# Patient Record
Sex: MALE | Race: WHITE | NOT HISPANIC OR LATINO | Employment: OTHER | URBAN - METROPOLITAN AREA
[De-identification: names, ages, dates, MRNs, and addresses within clinical notes are randomized per-mention and may not be internally consistent; named-entity substitution may affect disease eponyms.]

---

## 2018-07-27 RX ORDER — LEVOTHYROXINE SODIUM 0.05 MG/1
TABLET ORAL
COMMUNITY
Start: 2013-03-26 | End: 2018-07-30 | Stop reason: ALTCHOICE

## 2018-07-27 RX ORDER — ERGOCALCIFEROL 1.25 MG/1
1 CAPSULE ORAL WEEKLY
COMMUNITY
Start: 2013-03-26 | End: 2018-07-30 | Stop reason: ALTCHOICE

## 2018-07-27 RX ORDER — PAROXETINE HYDROCHLORIDE 20 MG/1
TABLET, FILM COATED ORAL DAILY
COMMUNITY
Start: 2010-07-30 | End: 2018-07-30 | Stop reason: ALTCHOICE

## 2018-07-27 RX ORDER — CITALOPRAM 20 MG/1
1 TABLET ORAL DAILY
COMMUNITY
Start: 2013-07-18 | End: 2018-07-30 | Stop reason: ALTCHOICE

## 2018-07-27 RX ORDER — CYCLOBENZAPRINE HCL 5 MG
1 TABLET ORAL 3 TIMES DAILY PRN
COMMUNITY
Start: 2014-01-13 | End: 2018-07-30 | Stop reason: ALTCHOICE

## 2018-07-30 ENCOUNTER — OFFICE VISIT (OUTPATIENT)
Dept: FAMILY MEDICINE CLINIC | Facility: CLINIC | Age: 53
End: 2018-07-30
Payer: COMMERCIAL

## 2018-07-30 VITALS
BODY MASS INDEX: 30.63 KG/M2 | HEART RATE: 74 BPM | WEIGHT: 179.4 LBS | SYSTOLIC BLOOD PRESSURE: 116 MMHG | TEMPERATURE: 97.5 F | HEIGHT: 64 IN | DIASTOLIC BLOOD PRESSURE: 60 MMHG | OXYGEN SATURATION: 97 % | RESPIRATION RATE: 16 BRPM

## 2018-07-30 DIAGNOSIS — H60.501 ACUTE OTITIS EXTERNA OF RIGHT EAR, UNSPECIFIED TYPE: Primary | ICD-10-CM

## 2018-07-30 PROCEDURE — 3008F BODY MASS INDEX DOCD: CPT | Performed by: FAMILY MEDICINE

## 2018-07-30 PROCEDURE — 99202 OFFICE O/P NEW SF 15 MIN: CPT | Performed by: FAMILY MEDICINE

## 2018-07-30 RX ORDER — TOBRAMYCIN AND DEXAMETHASONE 3; 1 MG/ML; MG/ML
4 SUSPENSION/ DROPS OPHTHALMIC 2 TIMES DAILY
Qty: 5 ML | Refills: 0 | Status: SHIPPED | OUTPATIENT
Start: 2018-07-30 | End: 2018-11-22 | Stop reason: ALTCHOICE

## 2018-07-30 NOTE — PROGRESS NOTES
Subjective:      Patient ID: Tyrus Apley is a 50 y o  male  patient is here to establish care  He is complaining itching and irritation  inside the right ear  This has been going on for almost 3 months now  Patient states that he started using peroxide ear drops  Which helped with his symptoms  Patient states that he thinks he is otherwise healthy  Review of his medications revealed that he used to be on anxiety medication in the past   Patient states that he stopped taking the medication a few years ago  He is currently denying any symptoms of anxiety and depression  He also used to take a low-dose of levothyroxine which he stopped when he stopped other medications  Patient has not had labs for a few years  Patient offered basic labs and screening colonoscopy  He is declining these today  Patient states that he will check with his insurance and follow-up for an annual physical   Ear Fullness    There is pain in the right ear  This is a chronic problem  Episode onset: 3 months  The problem has been unchanged  There has been no fever  The patient is experiencing no pain  Pertinent negatives include no coughing, ear discharge, headaches, hearing loss, neck pain, rash, rhinorrhea, sore throat or vomiting  Treatments tried: OTC Peroxide ear drops  The treatment provided mild relief  Past Medical History:   Diagnosis Date    Depression        No family history on file  No past surgical history on file  reports that he has been smoking  He has never used smokeless tobacco  He reports that he does not drink alcohol or use drugs        Current Outpatient Prescriptions:     tobramycin-dexamethasone (TOBRADEX) ophthalmic suspension, Apply 4 drops to eye 2 (two) times a day for 5 days APPLY IN RIGHT EAR CANAL , Disp: 5 mL, Rfl: 0    The following portions of the patient's history were reviewed and updated as appropriate: allergies, current medications, past family history, past medical history, past social history, past surgical history and problem list     Review of Systems   Constitutional: Negative  HENT: Negative for ear discharge, hearing loss, rhinorrhea and sore throat  Irritation of right ear canal     Respiratory: Negative  Negative for cough  Cardiovascular: Negative  Gastrointestinal: Negative  Negative for vomiting  Musculoskeletal: Negative  Negative for myalgias and neck pain  Skin: Negative for rash  Neurological: Negative  Negative for headaches  Psychiatric/Behavioral: Negative  Objective:    /60   Pulse 74   Temp 97 5 °F (36 4 °C)   Resp 16   Ht 5' 4" (1 626 m)   Wt 81 4 kg (179 lb 6 4 oz)   SpO2 97%   BMI 30 79 kg/m²      Physical Exam   Constitutional: He is oriented to person, place, and time  He appears well-developed and well-nourished  HENT:   Left Ear: External ear normal    Desquamated skin of the Rt Ear canal    TM- Normal     Cardiovascular: Normal rate, regular rhythm and normal heart sounds  Pulmonary/Chest: Effort normal and breath sounds normal    Abdominal: Soft  Bowel sounds are normal    Neurological: He is alert and oriented to person, place, and time  Psychiatric: His behavior is normal  Judgment normal          No results found for this or any previous visit (from the past 1008 hour(s))  Assessment/Plan:      Patient started on ear drops to be applied  In the right ear canal      Advised against the use of peroxide drops without medical supervision  Patient does not want any labs at today's visit  He will check with his insurance and then follow-up for an annual physical        Diagnoses and all orders for this visit:    Acute otitis externa of right ear, unspecified type  -     tobramycin-dexamethasone (TOBRADEX) ophthalmic suspension; Apply 4 drops to eye 2 (two) times a day for 5 days APPLY IN RIGHT EAR CANAL

## 2018-11-22 ENCOUNTER — HOSPITAL ENCOUNTER (EMERGENCY)
Facility: HOSPITAL | Age: 53
Discharge: HOME | End: 2018-11-22
Attending: EMERGENCY MEDICINE | Admitting: EMERGENCY MEDICINE
Payer: COMMERCIAL

## 2018-11-22 VITALS
TEMPERATURE: 97.6 F | DIASTOLIC BLOOD PRESSURE: 77 MMHG | BODY MASS INDEX: 29.52 KG/M2 | WEIGHT: 171.96 LBS | OXYGEN SATURATION: 98 % | HEART RATE: 90 BPM | RESPIRATION RATE: 16 BRPM | SYSTOLIC BLOOD PRESSURE: 129 MMHG

## 2018-11-22 DIAGNOSIS — S05.02XA ABRASION OF LEFT CORNEA, INITIAL ENCOUNTER: Primary | ICD-10-CM

## 2018-11-22 PROCEDURE — 99283 EMERGENCY DEPT VISIT LOW MDM: CPT

## 2018-11-22 RX ORDER — NAPROXEN 500 MG/1
500 TABLET ORAL 2 TIMES DAILY PRN
Qty: 20 TABLET | Refills: 0 | Status: SHIPPED | OUTPATIENT
Start: 2018-11-22 | End: 2020-08-03

## 2018-11-22 RX ORDER — TETRACAINE HYDROCHLORIDE 5 MG/ML
1 SOLUTION OPHTHALMIC ONCE
Status: COMPLETED | OUTPATIENT
Start: 2018-11-22 | End: 2018-11-22

## 2018-11-22 RX ORDER — TOBRAMYCIN 3 MG/ML
1 SOLUTION/ DROPS OPHTHALMIC ONCE
Status: COMPLETED | OUTPATIENT
Start: 2018-11-22 | End: 2018-11-22

## 2018-11-22 RX ADMIN — TETRACAINE HYDROCHLORIDE 1 DROP: 5 SOLUTION OPHTHALMIC at 17:55

## 2018-11-22 RX ADMIN — FLUORESCEIN SODIUM 1 STRIP: 1 STRIP OPHTHALMIC at 17:55

## 2018-11-22 RX ADMIN — TOBRAMYCIN 1 DROP: 3 SOLUTION OPHTHALMIC at 18:17

## 2018-11-22 NOTE — DISCHARGE INSTRUCTIONS
Corneal Abrasion   WHAT YOU NEED TO KNOW:   A corneal abrasion is a scratch on the cornea of your eye  The cornea is the clear layer that covers the front of your eye  A small scratch may heal in 1 to 2 days  Deeper or larger scratches may take longer to heal         DISCHARGE INSTRUCTIONS:   Contact your healthcare provider if:   · Your eye pain or vision gets worse  · You have yellow or green drainage from your eye  · You have questions or concerns about your condition or care  Medicines:   · Medicines  may be given in the form of eyedrops or ointment to help prevent an eye infection  You may also be given eye drops to decrease pain  Ask how to take this medicine safely  · Take your medicine as directed  Contact your healthcare provider if you think your medicine is not helping or if you have side effects  Tell him or her if you are allergic to any medicine  Keep a list of the medicines, vitamins, and herbs you take  Include the amounts, and when and why you take them  Bring the list or the pill bottles to follow-up visits  Carry your medicine list with you in case of an emergency  Follow up with your healthcare provider as directed:  Write down your questions so you remember to ask them during your visits  Self-care:   · Do not touch or rub your eye  · Ask your healthcare provider when you can start your normal activities  · Ask your healthcare provider when you can wear your contact lenses  · Wear sunglasses in bright light until your eyes feel better  Help prevent corneal abrasions:   · Remove your contact lenses if your eyes feel dry or irritated  · Wash your hands if you need to touch your eyes or your face  · Trim your child's fingernails so he cannot scratch his eye  · Wear protective eyewear when you work with chemicals, wood, dust, or metal      · Wear protective eyewear when you play sports  · Do not wear your contacts for longer than you should       · Do not wear colored lenses or lenses with shapes on them  These lenses may cause eye damage and vision loss  · Do not wear glitter makeup  Glitter can easily get into your eyes and under contact lenses  · Do not sleep with your contacts in your eyes  © 2017 [address] [employer] Information is for End User's use only and may not be sold, redistributed or otherwise used for commercial purposes  All illustrations and images included in CareNotes® are the copyrighted property of [employer] , [employer]  or [employer]  The above information is an  only  It is not intended as medical advice for individual conditions or treatments  Talk to your doctor, nurse or pharmacist before following any medical regimen to see if it is safe and effective for you

## 2018-11-22 NOTE — ED PROVIDER NOTES
History  Chief Complaint   Patient presents with    Foreign Body in Eye     Pt reports "grinding" approx 4 hours ago, concerned for metal in left eye  History provided by:  Patient and spouse  Foreign Body in Eye   Location:  L eye  Suspected object:  Metal  Quality: Feels like something still in my eye  Pain severity:  Mild  Duration:  4 hours  Timing:  Constant  Progression:  Improving (Flushed his eye with a large amount of water and this significantly improved his symptoms)  Chronicity:  New  Worsened by:  Nothing      None       Past Medical History:   Diagnosis Date    Depression        History reviewed  No pertinent surgical history  Family History   Problem Relation Age of Onset    No Known Problems Mother     No Known Problems Father      I have reviewed and agree with the history as documented  Social History   Substance Use Topics    Smoking status: Current Every Day Smoker    Smokeless tobacco: Never Used    Alcohol use No        Review of Systems   Constitutional: Negative for fever  Respiratory: Negative for chest tightness and shortness of breath  Cardiovascular: Negative for chest pain  Skin: Negative for rash  Neurological: Negative for dizziness, light-headedness and headaches  Physical Exam  Physical Exam   Constitutional: He appears well-developed  HENT:   Head: Atraumatic  Eyes: Conjunctivae are normal  Right eye exhibits no discharge  Left eye exhibits no discharge  No scleral icterus  Floor seen staining of left eye , there is 2 separate areas of uptake, 1 directly over center of pupil 1 left lower sclera  One over the pupil is very punctate  There does not appear to be in overlying foreign body just an area of uptake consistent with a small abrasion  There also appears to be a scleral abrasion   Neck: Neck supple  No tracheal deviation present  Pulmonary/Chest: Effort normal  No stridor  No respiratory distress     Musculoskeletal: He exhibits no deformity  Neurological: He is alert  He exhibits normal muscle tone  Coordination normal    Skin: Skin is warm and dry  No rash noted  No erythema  No pallor  Psychiatric: He has a normal mood and affect  Vitals reviewed  Vital Signs  ED Triage Vitals [11/22/18 1742]   Temperature Pulse Respirations Blood Pressure SpO2   97 6 °F (36 4 °C) 90 16 129/77 98 %      Temp Source Heart Rate Source Patient Position - Orthostatic VS BP Location FiO2 (%)   Oral -- -- -- --      Pain Score       2           Vitals:    11/22/18 1742   BP: 129/77   Pulse: 90       Visual Acuity      ED Medications  Medications   tetracaine 0 5 % ophthalmic solution 1 drop (1 drop Left Eye Given 11/22/18 1755)   fluorescein sodium sterile ophthalmic strip 1 strip (1 strip Left Eye Given 11/22/18 1755)   tobramycin (TOBREX) 0 3 % ophthalmic solution 1 drop (1 drop Left Eye Given 11/22/18 1817)       Diagnostic Studies  Results Reviewed     None                 No orders to display              Procedures  Procedures       Phone Contacts  ED Phone Contact    ED Course                               MDM  Number of Diagnoses or Management Options  Abrasion of left cornea, initial encounter: new and requires workup  Diagnosis management comments: No foreign body visualized believe patient likely cleared it when he flushed his eye  Still has a corneal abrasion directly over pupil which likely accounts for patient's symptoms    Will place on tobramycin drops and have patient follow with Ophthalmology if symptoms persist       Amount and/or Complexity of Data Reviewed  Decide to obtain previous medical records or to obtain history from someone other than the patient: yes  Obtain history from someone other than the patient: yes  Review and summarize past medical records: yes      CritCare Time    Disposition  Final diagnoses:   Abrasion of left cornea, initial encounter     Time reflects when diagnosis was documented in both MDM as applicable and the Disposition within this note     Time User Action Codes Description Comment    11/22/2018  6:04 PM Harika Donahue Add [S05  02XA] Abrasion of left cornea, initial encounter       ED Disposition     ED Disposition Condition Comment    Discharge  Geryl Rape discharge to home/self care  Condition at discharge: Good        Follow-up Information     Follow up With Specialties Details Why Contact Info    Timmy Kapadia MD Ophthalmology Call in 1 day To arrange for the next available appointment [facility]   [id number] [phone]  [phone]            There are no discharge medications for this patient  No discharge procedures on file      ED Provider  Electronically Signed by           Hermilo Blackman DO  11/22/18 3627

## 2019-01-07 ENCOUNTER — OFFICE VISIT (OUTPATIENT)
Dept: URGENT CARE | Facility: CLINIC | Age: 54
End: 2019-01-07
Payer: COMMERCIAL

## 2019-01-07 VITALS
TEMPERATURE: 98.4 F | WEIGHT: 166 LBS | DIASTOLIC BLOOD PRESSURE: 81 MMHG | HEART RATE: 100 BPM | HEIGHT: 64 IN | OXYGEN SATURATION: 97 % | BODY MASS INDEX: 28.34 KG/M2 | SYSTOLIC BLOOD PRESSURE: 119 MMHG | RESPIRATION RATE: 16 BRPM

## 2019-01-07 DIAGNOSIS — R10.84 GENERALIZED ABDOMINAL PAIN: Primary | ICD-10-CM

## 2019-01-07 DIAGNOSIS — K59.00 CONSTIPATION, UNSPECIFIED CONSTIPATION TYPE: ICD-10-CM

## 2019-01-07 PROCEDURE — 99213 OFFICE O/P EST LOW 20 MIN: CPT | Performed by: PHYSICIAN ASSISTANT

## 2019-01-07 NOTE — PROGRESS NOTES
[facility]        NAME: Velma Camacho is a 48 y o  male  : 1965    MRN: [id number]  DATE: 2019  TIME: 11:30 AM    Assessment and Plan   Generalized abdominal pain [R10 84]  1  Generalized abdominal pain  XR abdomen obstruction series   2  Constipation, unspecified constipation type           Patient Instructions     1  Take half a bottle of Magnesium Citrate  Wait 4-6 hours for a  Bowel movement  If no bowel movement should occur, take the other half  Increase fluids  2  Proceed to ER if no bowel movement after drinking the whole bottle or for any worsening symptoms such as increasing abdominal pain, vomiting, fevers  Chief Complaint     Chief Complaint   Patient presents with    Constipation     x 1 wek, fatigue          History of Present Illness       Pt is a 48 yr old male presenting for generalized abdominal pain and constipation for the past week  He reports the pain is sharp and crampy  He is passing gas and having small bowel movements occasionally  He has been drinking prune juice and took Miralax last night with no change  No fevers, vomiting, change in foods, abdominal surgical history  There is some air-fluid levels present on abdominal obstruction x-ray series  As he is well appearing with no fevers or vomiting, I will have him try a trial of mag citrate  If no improvement or worsening sx, he understands to proceed to ER  Review of Systems   Review of Systems   Constitutional: Negative for chills, fatigue and fever  HENT: Negative for congestion  Respiratory: Negative for cough  Gastrointestinal: Positive for abdominal pain and constipation  Negative for diarrhea, nausea and vomiting  Skin: Negative for rash           Current Medications       Current Outpatient Prescriptions:     naproxen (NAPROSYN) 500 mg tablet, Take 1 tablet (500 mg total) by mouth 2 (two) times a day as needed for mild pain, Disp: 20 tablet, Rfl: 0    Current Allergies Allergies as of 01/07/2019    (No Known Allergies)            The following portions of the patient's history were reviewed and updated as appropriate: allergies, current medications, past family history, past medical history, past social history, past surgical history and problem list      Past Medical History:   Diagnosis Date    Depression        History reviewed  No pertinent surgical history  Family History   Problem Relation Age of Onset    No Known Problems Mother     No Known Problems Father          Medications have been verified  Objective   /81 (BP Location: Right arm, Patient Position: Sitting, Cuff Size: Standard)   Pulse 100   Temp 98 4 °F (36 9 °C) (Tympanic)   Resp 16   Ht 5' 4" (1 626 m)   Wt 75 3 kg (166 lb)   SpO2 97%   BMI 28 49 kg/m²        Physical Exam     Physical Exam   Constitutional: He is oriented to person, place, and time  He appears well-developed and well-nourished  No distress  HENT:   Head: Normocephalic and atraumatic  Eyes: Conjunctivae are normal    Neck: Normal range of motion  Cardiovascular: Normal rate, regular rhythm and normal heart sounds  Pulmonary/Chest: Effort normal and breath sounds normal    Abdominal: Soft  Bowel sounds are normal  He exhibits no distension and no mass  There is no tenderness  There is no rebound and no guarding  Musculoskeletal: Normal range of motion  Neurological: He is alert and oriented to person, place, and time  He has normal reflexes  Skin: Skin is warm and dry  He is not diaphoretic  Psychiatric: He has a normal mood and affect  His behavior is normal  Judgment and thought content normal        Abdominal Obstruction series x-ray: air-fluid levels present

## 2019-01-07 NOTE — PATIENT INSTRUCTIONS
1  Take half a bottle of Magnesium Citrate  Wait 4-6 hours for a  Bowel movement  If no bowel movement should occur, take the other half  Increase fluids  2  Proceed to ER if no bowel movement after drinking the whole bottle or for any worsening symptoms such as increasing abdominal pain, vomiting, fevers

## 2019-01-08 ENCOUNTER — HOSPITAL ENCOUNTER (INPATIENT)
Facility: HOSPITAL | Age: 54
LOS: 6 days | Discharge: HOME | DRG: 392 | End: 2019-01-14
Attending: EMERGENCY MEDICINE | Admitting: SURGERY
Payer: COMMERCIAL

## 2019-01-08 ENCOUNTER — APPOINTMENT (EMERGENCY)
Dept: CT IMAGING | Facility: HOSPITAL | Age: 54
DRG: 392 | End: 2019-01-08
Payer: COMMERCIAL

## 2019-01-08 DIAGNOSIS — L02.91 ABSCESS: ICD-10-CM

## 2019-01-08 DIAGNOSIS — K57.92 DIVERTICULITIS: Primary | ICD-10-CM

## 2019-01-08 LAB
ALBUMIN SERPL BCP-MCNC: 3.5 G/DL (ref 3.5–5)
ALP SERPL-CCNC: 84 U/L (ref 46–116)
ALT SERPL W P-5'-P-CCNC: 40 U/L (ref 12–78)
ANION GAP SERPL CALCULATED.3IONS-SCNC: 8 MMOL/L (ref 4–13)
APTT PPP: 70 SECONDS (ref 26–38)
AST SERPL W P-5'-P-CCNC: 16 U/L (ref 5–45)
BACTERIA UR QL AUTO: NORMAL /HPF
BASOPHILS # BLD AUTO: 0.07 THOUSANDS/ΜL (ref 0–0.1)
BASOPHILS NFR BLD AUTO: 1 % (ref 0–1)
BILIRUB SERPL-MCNC: 0.5 MG/DL (ref 0.2–1)
BILIRUB UR QL STRIP: NEGATIVE
BUN SERPL-MCNC: 16 MG/DL (ref 5–25)
CALCIUM SERPL-MCNC: 9.5 MG/DL (ref 8.3–10.1)
CHLORIDE SERPL-SCNC: 96 MMOL/L (ref 100–108)
CLARITY UR: CLEAR
CO2 SERPL-SCNC: 31 MMOL/L (ref 21–32)
COLOR UR: YELLOW
CREAT SERPL-MCNC: 0.9 MG/DL (ref 0.6–1.3)
EOSINOPHIL # BLD AUTO: 0.13 THOUSAND/ΜL (ref 0–0.61)
EOSINOPHIL NFR BLD AUTO: 1 % (ref 0–6)
ERYTHROCYTE [DISTWIDTH] IN BLOOD BY AUTOMATED COUNT: 12 % (ref 11.6–15.1)
GFR SERPL CREATININE-BSD FRML MDRD: 99 ML/MIN/1.73SQ M
GLUCOSE SERPL-MCNC: 95 MG/DL (ref 65–140)
GLUCOSE UR STRIP-MCNC: NEGATIVE MG/DL
HCT VFR BLD AUTO: 49 % (ref 36.5–49.3)
HGB BLD-MCNC: 16.8 G/DL (ref 12–17)
HGB UR QL STRIP.AUTO: ABNORMAL
IMM GRANULOCYTES # BLD AUTO: 0.03 THOUSAND/UL (ref 0–0.2)
IMM GRANULOCYTES NFR BLD AUTO: 0 % (ref 0–2)
INR PPP: 1.15 (ref 0.86–1.17)
KETONES UR STRIP-MCNC: NEGATIVE MG/DL
LEUKOCYTE ESTERASE UR QL STRIP: NEGATIVE
LIPASE SERPL-CCNC: 94 U/L (ref 73–393)
LYMPHOCYTES # BLD AUTO: 1.35 THOUSANDS/ΜL (ref 0.6–4.47)
LYMPHOCYTES NFR BLD AUTO: 12 % (ref 14–44)
MCH RBC QN AUTO: 30.3 PG (ref 26.8–34.3)
MCHC RBC AUTO-ENTMCNC: 34.3 G/DL (ref 31.4–37.4)
MCV RBC AUTO: 88 FL (ref 82–98)
MONOCYTES # BLD AUTO: 1.99 THOUSAND/ΜL (ref 0.17–1.22)
MONOCYTES NFR BLD AUTO: 18 % (ref 4–12)
NEUTROPHILS # BLD AUTO: 7.74 THOUSANDS/ΜL (ref 1.85–7.62)
NEUTS SEG NFR BLD AUTO: 68 % (ref 43–75)
NITRITE UR QL STRIP: NEGATIVE
NON-SQ EPI CELLS URNS QL MICRO: NORMAL /HPF
NRBC BLD AUTO-RTO: 0 /100 WBCS
PH UR STRIP.AUTO: 5.5 [PH] (ref 4.5–8)
PLATELET # BLD AUTO: 289 THOUSANDS/UL (ref 149–390)
PMV BLD AUTO: 9.3 FL (ref 8.9–12.7)
POTASSIUM SERPL-SCNC: 4.8 MMOL/L (ref 3.5–5.3)
PROT SERPL-MCNC: 7.7 G/DL (ref 6.4–8.2)
PROT UR STRIP-MCNC: NEGATIVE MG/DL
PROTHROMBIN TIME: 14.4 SECONDS (ref 11.8–14.2)
RBC # BLD AUTO: 5.54 MILLION/UL (ref 3.88–5.62)
RBC #/AREA URNS AUTO: NORMAL /HPF
SODIUM SERPL-SCNC: 135 MMOL/L (ref 136–145)
SP GR UR STRIP.AUTO: 1.01 (ref 1–1.03)
UROBILINOGEN UR QL STRIP.AUTO: 0.2 E.U./DL
WBC # BLD AUTO: 11.31 THOUSAND/UL (ref 4.31–10.16)
WBC #/AREA URNS AUTO: NORMAL /HPF

## 2019-01-08 PROCEDURE — 85730 THROMBOPLASTIN TIME PARTIAL: CPT | Performed by: EMERGENCY MEDICINE

## 2019-01-08 PROCEDURE — 96374 THER/PROPH/DIAG INJ IV PUSH: CPT

## 2019-01-08 PROCEDURE — 96361 HYDRATE IV INFUSION ADD-ON: CPT

## 2019-01-08 PROCEDURE — 85025 COMPLETE CBC W/AUTO DIFF WBC: CPT | Performed by: EMERGENCY MEDICINE

## 2019-01-08 PROCEDURE — 83690 ASSAY OF LIPASE: CPT | Performed by: EMERGENCY MEDICINE

## 2019-01-08 PROCEDURE — 87040 BLOOD CULTURE FOR BACTERIA: CPT | Performed by: EMERGENCY MEDICINE

## 2019-01-08 PROCEDURE — 74177 CT ABD & PELVIS W/CONTRAST: CPT

## 2019-01-08 PROCEDURE — 81001 URINALYSIS AUTO W/SCOPE: CPT

## 2019-01-08 PROCEDURE — 80053 COMPREHEN METABOLIC PANEL: CPT | Performed by: EMERGENCY MEDICINE

## 2019-01-08 PROCEDURE — 36415 COLL VENOUS BLD VENIPUNCTURE: CPT | Performed by: EMERGENCY MEDICINE

## 2019-01-08 PROCEDURE — 99285 EMERGENCY DEPT VISIT HI MDM: CPT

## 2019-01-08 PROCEDURE — 85610 PROTHROMBIN TIME: CPT | Performed by: EMERGENCY MEDICINE

## 2019-01-08 RX ORDER — HYDROMORPHONE HCL/PF 1 MG/ML
1 SYRINGE (ML) INJECTION EVERY 4 HOURS PRN
Status: DISCONTINUED | OUTPATIENT
Start: 2019-01-08 | End: 2019-01-11

## 2019-01-08 RX ORDER — ONDANSETRON 2 MG/ML
4 INJECTION INTRAMUSCULAR; INTRAVENOUS ONCE
Status: COMPLETED | OUTPATIENT
Start: 2019-01-08 | End: 2019-01-08

## 2019-01-08 RX ORDER — HYDROMORPHONE HCL/PF 1 MG/ML
0.5 SYRINGE (ML) INJECTION
Status: DISCONTINUED | OUTPATIENT
Start: 2019-01-08 | End: 2019-01-11

## 2019-01-08 RX ORDER — ACETAMINOPHEN 325 MG/1
650 TABLET ORAL EVERY 6 HOURS PRN
Status: DISCONTINUED | OUTPATIENT
Start: 2019-01-08 | End: 2019-01-14 | Stop reason: HOSPADM

## 2019-01-08 RX ORDER — SODIUM CHLORIDE, SODIUM LACTATE, POTASSIUM CHLORIDE, CALCIUM CHLORIDE 600; 310; 30; 20 MG/100ML; MG/100ML; MG/100ML; MG/100ML
125 INJECTION, SOLUTION INTRAVENOUS CONTINUOUS
Status: DISCONTINUED | OUTPATIENT
Start: 2019-01-08 | End: 2019-01-10

## 2019-01-08 RX ORDER — CIPROFLOXACIN 2 MG/ML
400 INJECTION, SOLUTION INTRAVENOUS ONCE
Status: COMPLETED | OUTPATIENT
Start: 2019-01-08 | End: 2019-01-08

## 2019-01-08 RX ORDER — NICOTINE 21 MG/24HR
1 PATCH, TRANSDERMAL 24 HOURS TRANSDERMAL DAILY
Status: DISCONTINUED | OUTPATIENT
Start: 2019-01-08 | End: 2019-01-14 | Stop reason: HOSPADM

## 2019-01-08 RX ADMIN — SODIUM CHLORIDE, SODIUM LACTATE, POTASSIUM CHLORIDE, AND CALCIUM CHLORIDE 125 ML/HR: .6; .31; .03; .02 INJECTION, SOLUTION INTRAVENOUS at 22:15

## 2019-01-08 RX ADMIN — SODIUM CHLORIDE 1000 ML: 0.9 INJECTION, SOLUTION INTRAVENOUS at 17:15

## 2019-01-08 RX ADMIN — IOHEXOL 100 ML: 350 INJECTION, SOLUTION INTRAVENOUS at 19:02

## 2019-01-08 RX ADMIN — CIPROFLOXACIN 400 MG: 2 INJECTION, SOLUTION INTRAVENOUS at 22:19

## 2019-01-08 RX ADMIN — SODIUM CHLORIDE 3 G: 9 INJECTION, SOLUTION INTRAVENOUS at 23:48

## 2019-01-08 RX ADMIN — ONDANSETRON 4 MG: 2 INJECTION INTRAMUSCULAR; INTRAVENOUS at 17:15

## 2019-01-08 RX ADMIN — METRONIDAZOLE 500 MG: 500 INJECTION, SOLUTION INTRAVENOUS at 19:47

## 2019-01-08 RX ADMIN — IOHEXOL 50 ML: 240 INJECTION, SOLUTION INTRATHECAL; INTRAVASCULAR; INTRAVENOUS; ORAL at 17:28

## 2019-01-08 NOTE — ED PROVIDER NOTES
History  Chief Complaint   Patient presents with    Constipation     c/o generalized abdominal pain and bloating, increased fatigue x 2 week  Pt also c/o constipation x 1 week  Pt took 1/2 mag citrate last night and this morning "and only had liquid stools"  History provided by:  Patient   used: No    Constipation   Associated symptoms: abdominal pain and nausea    Associated symptoms: no diarrhea, no dysuria, no fever and no vomiting      Patient is a 53-year-old male presenting to emergency department with abdominal pain, nausea and constipation  Has not had normal bowel movement and a week  Use magnesium citrate today  Had small amount of liquid stool  No vomiting  No fevers  No back pain  No chest pain shortness of breath  No fevers  Former crack cocaine abuser and alcoholic, last use 10 years ago  MDM will check electrolytes, CT to evaluate for mass obstruction      Prior to Admission Medications   Prescriptions Last Dose Informant Patient Reported? Taking?   naproxen (NAPROSYN) 500 mg tablet   No No   Sig: Take 1 tablet (500 mg total) by mouth 2 (two) times a day as needed for mild pain      Facility-Administered Medications: None       Past Medical History:   Diagnosis Date    Depression        History reviewed  No pertinent surgical history  Family History   Problem Relation Age of Onset    No Known Problems Mother     No Known Problems Father      I have reviewed and agree with the history as documented  Social History   Substance Use Topics    Smoking status: Current Every Day Smoker     Packs/day: 2 00     Types: Cigarettes    Smokeless tobacco: Never Used    Alcohol use No        Review of Systems   Constitutional: Negative for chills, diaphoresis and fever  HENT: Negative for congestion and sore throat  Respiratory: Negative for cough, shortness of breath, wheezing and stridor      Cardiovascular: Negative for chest pain, palpitations and leg swelling  Gastrointestinal: Positive for abdominal pain, constipation and nausea  Negative for blood in stool, diarrhea and vomiting  Genitourinary: Negative for dysuria, frequency and urgency  Musculoskeletal: Negative for neck pain and neck stiffness  Skin: Negative for pallor and rash  Neurological: Negative for dizziness, syncope, weakness, light-headedness and headaches  All other systems reviewed and are negative  Physical Exam  Physical Exam   Constitutional: He is oriented to person, place, and time  He appears well-developed and well-nourished  HENT:   Head: Normocephalic and atraumatic  Eyes: Pupils are equal, round, and reactive to light  Neck: Neck supple  Cardiovascular: Normal rate, regular rhythm, normal heart sounds and intact distal pulses  Pulmonary/Chest: Effort normal and breath sounds normal  No respiratory distress  Abdominal: Soft  Bowel sounds are normal  There is tenderness  Right lower and upper quadrant tenderness   Musculoskeletal: Normal range of motion  He exhibits no edema, tenderness or deformity  Neurological: He is alert and oriented to person, place, and time  Skin: Skin is warm and dry  Capillary refill takes less than 2 seconds  No rash noted  No erythema  No pallor  Vitals reviewed        Vital Signs  ED Triage Vitals [01/08/19 1645]   Temperature Pulse Respirations Blood Pressure SpO2   98 5 °F (36 9 °C) 98 18 124/69 100 %      Temp Source Heart Rate Source Patient Position - Orthostatic VS BP Location FiO2 (%)   Oral Monitor Sitting Left arm --      Pain Score       7           Vitals:    01/08/19 1645 01/08/19 1812 01/08/19 2133 01/09/19 0000   BP: 124/69 117/77 117/56 114/66   Pulse: 98 84 84 (!) 53   Patient Position - Orthostatic VS: Sitting Lying Lying Lying       Visual Acuity      ED Medications  Medications   lactated ringers infusion (125 mL/hr Intravenous New Bag 1/8/19 2215)   nicotine (NICODERM CQ) 14 mg/24hr TD 24 hr patch 1 patch (1 patch Transdermal Not Given 1/8/19 2221)   enoxaparin (LOVENOX) subcutaneous injection 40 mg (not administered)   ampicillin-sulbactam (UNASYN) 3 g in sodium chloride 0 9 % 100 mL IVPB (3 g Intravenous New Bag 1/8/19 2348)   HYDROmorphone (DILAUDID) injection 1 mg (not administered)   HYDROmorphone (DILAUDID) injection 0 5 mg (not administered)   acetaminophen (TYLENOL) tablet 650 mg (not administered)   sodium chloride 0 9 % bolus 1,000 mL (0 mL Intravenous Stopped 1/8/19 2022)   ondansetron (ZOFRAN) injection 4 mg (4 mg Intravenous Given 1/8/19 1715)   iohexol (OMNIPAQUE) 240 MG/ML solution 50 mL (50 mL Oral Given 1/8/19 1728)   iohexol (OMNIPAQUE) 350 MG/ML injection (MULTI-DOSE) 100 mL (100 mL Intravenous Given 1/8/19 1902)   ciprofloxacin (CIPRO) IVPB (premix) 400 mg (400 mg Intravenous New Bag 1/8/19 2219)   metroNIDAZOLE (FLAGYL) IVPB (premix) 500 mg (0 mg Intravenous Stopped 1/8/19 2045)       Diagnostic Studies  Results Reviewed     Procedure Component Value Units Date/Time    Platelet count [[id number]]     Lab Status:  No result Specimen:  Blood     Blood culture #1 [[id number]] Collected:  01/08/19 1941    Lab Status: In process Specimen:  Blood from Hand, Right Updated:  01/08/19 1943    Blood culture #2 [[id number]] Collected:  01/08/19 1935    Lab Status:   In process Specimen:  Blood from Arm, Left Updated:  01/08/19 1940    Urine Microscopic [[id number]]  (Normal) Collected:  01/08/19 1901    Lab Status:  Final result Specimen:  Urine from Urine, Clean Catch Updated:  01/08/19 1939     RBC, UA None Seen /hpf      WBC, UA None Seen /hpf      Epithelial Cells None Seen /hpf      Bacteria, UA Occasional /hpf     ED Urine Macroscopic [[id number]]  (Abnormal) Collected:  01/08/19 1901    Lab Status:  Final result Specimen:  Urine Updated:  01/08/19 1901     Color, UA Yellow     Clarity, UA Clear     pH, UA 5 5     Leukocytes, UA Negative     Nitrite, UA Negative     Protein, UA Negative mg/dl Glucose, UA Negative mg/dl      Ketones, UA Negative mg/dl      Urobilinogen, UA 0 2 E U /dl      Bilirubin, UA Negative     Blood, UA Trace (A)     Specific [place], UA 1 015    Narrative:       CLINITEK RESULT    Protime-INR [[id number]]  (Abnormal) Collected:  01/08/19 1715    Lab Status:  Final result Specimen:  Blood from Arm, Right Updated:  01/08/19 1750     Protime 14 4 (H) seconds      INR 1 15    APTT [[id number]]  (Abnormal) Collected:  01/08/19 1715    Lab Status:  Final result Specimen:  Blood from Arm, Right Updated:  01/08/19 1750     PTT 70 (H) seconds     Comprehensive metabolic panel [[id number]]  (Abnormal) Collected:  01/08/19 1715    Lab Status:  Final result Specimen:  Blood from Arm, Right Updated:  01/08/19 1748     Sodium 135 (L) mmol/L      Potassium 4 8 mmol/L      Chloride 96 (L) mmol/L      CO2 31 mmol/L      ANION GAP 8 mmol/L      BUN 16 mg/dL      Creatinine 0 90 mg/dL      Glucose 95 mg/dL      Calcium 9 5 mg/dL      AST 16 U/L      ALT 40 U/L      Alkaline Phosphatase 84 U/L      Total Protein 7 7 g/dL      Albumin 3 5 g/dL      Total Bilirubin 0 50 mg/dL      eGFR 99 ml/min/1 73sq m     Narrative:         National Kidney Disease Education Program recommendations are as follows:  GFR calculation is accurate only with a steady state creatinine  Chronic Kidney disease less than 60 ml/min/1 73 sq  meters  Kidney failure less than 15 ml/min/1 73 sq  meters      Lipase [[id number]]  (Normal) Collected:  01/08/19 1715    Lab Status:  Final result Specimen:  Blood from Arm, Right Updated:  01/08/19 1748     Lipase 94 u/L     CBC and differential [[id number]]  (Abnormal) Collected:  01/08/19 1715    Lab Status:  Final result Specimen:  Blood from Arm, Right Updated:  01/08/19 1729     WBC 11 31 (H) Thousand/uL      RBC 5 54 Million/uL      Hemoglobin 16 8 g/dL      Hematocrit 49 0 %      MCV 88 fL      MCH 30 3 pg      MCHC 34 3 g/dL      RDW 12 0 %      MPV 9 3 fL      Platelets 019 Thousands/uL nRBC 0 /100 WBCs      Neutrophils Relative 68 %      Immat GRANS % 0 %      Lymphocytes Relative 12 (L) %      Monocytes Relative 18 (H) %      Eosinophils Relative 1 %      Basophils Relative 1 %      Neutrophils Absolute 7 74 (H) Thousands/µL      Immature Grans Absolute 0 03 Thousand/uL      Lymphocytes Absolute 1 35 Thousands/µL      Monocytes Absolute 1 99 (H) Thousand/µL      Eosinophils Absolute 0 13 Thousand/µL      Basophils Absolute 0 07 Thousands/µL                  CT abdomen pelvis with contrast   Final Result by Victor Hugo Cortés MD (01/08 1917)      Moderate colonic wall thickening and surrounding inflammatory stranding involving the mid to distal sigmoid colon with an associated adjacent 3 8 x 2 7 cm air and fluid collection  The findings are compatible with acute sigmoid diverticulitis with    pelvic abscess formation  No evidence of large or small bowel obstruction  I personally discussed this study with ALEKSEY CASTILLO on 1/8/2019 at 7:17 PM                      Workstation performed: [id number]         IR consult    (Results Pending)              Procedures  Procedures       Phone Contacts  ED Phone Contact    ED Course                               MDM  CritCare Time    Disposition  Final diagnoses:   Diverticulitis   Abscess     Time reflects when diagnosis was documented in both MDM as applicable and the Disposition within this note     Time User Action Codes Description Comment    1/8/2019  7:22 PM Aleksey Castillo [K57 92] Diverticulitis     1/8/2019  7:22 PM Aleksey Castillo [L02 91] Abscess       ED Disposition     ED Disposition Condition Comment    Admit  Case was discussed with surgery/Dr Ke Best and the patient's admission status was agreed to be Admission Status: inpatient status to the service of Dr Gloria Rosales           Follow-up Information    None         Current Discharge Medication List      CONTINUE these medications which have NOT CHANGED Details   naproxen (NAPROSYN) 500 mg tablet Take 1 tablet (500 mg total) by mouth 2 (two) times a day as needed for mild pain  Qty: 20 tablet, Refills: 0    Associated Diagnoses: Abrasion of left cornea, initial encounter           No discharge procedures on file      ED Provider  Electronically Signed by           Jose Atkins MD  01/09/19 9319

## 2019-01-09 PROBLEM — K57.20 DIVERTICULITIS OF LARGE INTESTINE WITH ABSCESS WITHOUT BLEEDING: Status: ACTIVE | Noted: 2019-01-09

## 2019-01-09 LAB
ANION GAP SERPL CALCULATED.3IONS-SCNC: 5 MMOL/L (ref 4–13)
BASOPHILS # BLD AUTO: 0.06 THOUSANDS/ΜL (ref 0–0.1)
BASOPHILS NFR BLD AUTO: 1 % (ref 0–1)
BUN SERPL-MCNC: 13 MG/DL (ref 5–25)
CALCIUM SERPL-MCNC: 9.2 MG/DL (ref 8.3–10.1)
CHLORIDE SERPL-SCNC: 100 MMOL/L (ref 100–108)
CO2 SERPL-SCNC: 31 MMOL/L (ref 21–32)
CREAT SERPL-MCNC: 0.88 MG/DL (ref 0.6–1.3)
EOSINOPHIL # BLD AUTO: 0.17 THOUSAND/ΜL (ref 0–0.61)
EOSINOPHIL NFR BLD AUTO: 2 % (ref 0–6)
ERYTHROCYTE [DISTWIDTH] IN BLOOD BY AUTOMATED COUNT: 12.4 % (ref 11.6–15.1)
GFR SERPL CREATININE-BSD FRML MDRD: 99 ML/MIN/1.73SQ M
GLUCOSE SERPL-MCNC: 108 MG/DL (ref 65–140)
HCT VFR BLD AUTO: 44.4 % (ref 36.5–49.3)
HGB BLD-MCNC: 14.8 G/DL (ref 12–17)
IMM GRANULOCYTES # BLD AUTO: 0.03 THOUSAND/UL (ref 0–0.2)
IMM GRANULOCYTES NFR BLD AUTO: 0 % (ref 0–2)
LYMPHOCYTES # BLD AUTO: 1.38 THOUSANDS/ΜL (ref 0.6–4.47)
LYMPHOCYTES NFR BLD AUTO: 15 % (ref 14–44)
MCH RBC QN AUTO: 29.5 PG (ref 26.8–34.3)
MCHC RBC AUTO-ENTMCNC: 33.3 G/DL (ref 31.4–37.4)
MCV RBC AUTO: 89 FL (ref 82–98)
MONOCYTES # BLD AUTO: 1.89 THOUSAND/ΜL (ref 0.17–1.22)
MONOCYTES NFR BLD AUTO: 20 % (ref 4–12)
NEUTROPHILS # BLD AUTO: 5.82 THOUSANDS/ΜL (ref 1.85–7.62)
NEUTS SEG NFR BLD AUTO: 62 % (ref 43–75)
NRBC BLD AUTO-RTO: 0 /100 WBCS
PLATELET # BLD AUTO: 269 THOUSANDS/UL (ref 149–390)
PMV BLD AUTO: 9.2 FL (ref 8.9–12.7)
POTASSIUM SERPL-SCNC: 4.2 MMOL/L (ref 3.5–5.3)
RBC # BLD AUTO: 5.01 MILLION/UL (ref 3.88–5.62)
SODIUM SERPL-SCNC: 136 MMOL/L (ref 136–145)
WBC # BLD AUTO: 9.35 THOUSAND/UL (ref 4.31–10.16)

## 2019-01-09 PROCEDURE — 80048 BASIC METABOLIC PNL TOTAL CA: CPT | Performed by: PHYSICIAN ASSISTANT

## 2019-01-09 PROCEDURE — 85025 COMPLETE CBC W/AUTO DIFF WBC: CPT | Performed by: PHYSICIAN ASSISTANT

## 2019-01-09 RX ADMIN — SODIUM CHLORIDE 3 G: 9 INJECTION, SOLUTION INTRAVENOUS at 14:22

## 2019-01-09 RX ADMIN — SODIUM CHLORIDE, SODIUM LACTATE, POTASSIUM CHLORIDE, AND CALCIUM CHLORIDE 125 ML/HR: .6; .31; .03; .02 INJECTION, SOLUTION INTRAVENOUS at 19:02

## 2019-01-09 RX ADMIN — SODIUM CHLORIDE 3 G: 9 INJECTION, SOLUTION INTRAVENOUS at 06:04

## 2019-01-09 RX ADMIN — HYDROMORPHONE HYDROCHLORIDE 0.5 MG: 1 INJECTION, SOLUTION INTRAMUSCULAR; INTRAVENOUS; SUBCUTANEOUS at 08:31

## 2019-01-09 RX ADMIN — METRONIDAZOLE 500 MG: 500 INJECTION, SOLUTION INTRAVENOUS at 16:19

## 2019-01-09 RX ADMIN — NICOTINE 1 PATCH: 14 PATCH TRANSDERMAL at 23:04

## 2019-01-09 RX ADMIN — HYDROMORPHONE HYDROCHLORIDE 1 MG: 1 INJECTION, SOLUTION INTRAMUSCULAR; INTRAVENOUS; SUBCUTANEOUS at 06:04

## 2019-01-09 RX ADMIN — METRONIDAZOLE 500 MG: 500 INJECTION, SOLUTION INTRAVENOUS at 23:05

## 2019-01-09 RX ADMIN — SODIUM CHLORIDE 3 G: 9 INJECTION, SOLUTION INTRAVENOUS at 19:02

## 2019-01-09 RX ADMIN — METRONIDAZOLE 500 MG: 500 INJECTION, SOLUTION INTRAVENOUS at 08:31

## 2019-01-09 RX ADMIN — HYDROMORPHONE HYDROCHLORIDE 1 MG: 1 INJECTION, SOLUTION INTRAMUSCULAR; INTRAVENOUS; SUBCUTANEOUS at 16:25

## 2019-01-09 NOTE — PROGRESS NOTES
Reviewed request for sigmoid diverticular abscess drain placement  Roughly 3 x 4 cm high central pelvic diverticular abscess draped in sigmoid colon not amenable to percutaneous access (high risk of colonic wall transgression)  Discussed with Dr Harish Casillas runoff via [address] [place] [address] at 8:15am today

## 2019-01-09 NOTE — UTILIZATION REVIEW
Initial Clinical Review    Admission: Date/Time/Statement: 1/8/19 @ 1923     Orders Placed This Encounter   Procedures    Inpatient Admission (expected length of stay for this patient is greater than two midnights)     Standing Status:   Standing     Number of Occurrences:   1     Order Specific Question:   Admitting Physician     Answer:   Elizabet Sotelo [[id number]]     Order Specific Question:   Level of Care     Answer:   Med Surg [[id number]]     Order Specific Question:   Estimated length of stay     Answer:   More than 2 Midnights     Order Specific Question:   Certification     Answer:   I certify that inpatient services are medically necessary for this patient for a duration of greater than two midnights  See H&P and MD Progress Notes for additional information about the patient's course of treatment  ED: Date/Time/Mode of Arrival:   ED Arrival Information     Expected Arrival Acuity Means of Arrival Escorted By Service Admission Type    - 1/8/2019 16:39 Urgent Walk-In Family [facility] Surgery-General Urgent    Arrival Complaint    constipation          Chief Complaint:   Chief Complaint   Patient presents with    Constipation     c/o generalized abdominal pain and bloating, increased fatigue x 2 week  Pt also c/o constipation x 1 week  Pt took 1/2 mag citrate last night and this morning "and only had liquid stools"  History of Illness: 48y o  year old male who presents with 1 week of constipation and abdominal pain  He states the pain really evolved over the last day but the constipation has been for a week  Denies fevers/chills/nausea/vomiting  Never had diverticulitis before  Never had colonoscopy before  Pain is constant in his lower abdomen       ED Vital Signs:   ED Triage Vitals [01/08/19 1645]   Temperature Pulse Respirations Blood Pressure SpO2   98 5 °F (36 9 °C) 98 18 124/69 100 %      Temp Source Heart Rate Source Patient Position - Orthostatic VS BP Location FiO2 (%)   Oral Monitor Sitting Left arm --      Pain Score       7        Wt Readings from Last 1 Encounters:   01/08/19 76 7 kg (169 lb)       Vital Signs (abnormal):   01/09/19 0000  98 5 °F (36 9 °C)   53  18  114/66  --  94 %  None (Room air)       Abnormal Labs/Diagnostic Test Results: PT 14 4  PTT 70  Na 135  Cl 96  Wbc 11 31    Ct abdomen - Moderate colonic wall thickening and surrounding inflammatory stranding involving the mid to distal sigmoid colon with an associated adjacent 3 8 x 2 7 cm air and fluid collection   The findings are compatible with acute sigmoid diverticulitis with pelvic abscess formation    1/9/2019  Wbc 9 35  Per IR - Reviewed request for sigmoid diverticular abscess drain placement  Roughly 3 x 4 cm high central pelvic diverticular abscess draped in sigmoid colon not amenable to percutaneous access (high risk of colonic wall transgression)  ED Treatment:   Medication Administration from 01/08/2019 1639 to 01/08/2019 2100       Date/Time Order Dose Route Action Comments     01/08/2019 2022 sodium chloride 0 9 % bolus 1,000 mL 0 mL Intravenous Stopped      01/08/2019 1715 sodium chloride 0 9 % bolus 1,000 mL 1,000 mL Intravenous New Bag      01/08/2019 1715 ondansetron (ZOFRAN) injection 4 mg 4 mg Intravenous Given      01/08/2019 1728 iohexol (OMNIPAQUE) 240 MG/ML solution 50 mL 50 mL Oral Given      01/08/2019 1902 iohexol (OMNIPAQUE) 350 MG/ML injection (MULTI-DOSE) 100 mL 100 mL Intravenous Given      01/08/2019 2045 metroNIDAZOLE (FLAGYL) IVPB (premix) 500 mg 0 mg Intravenous Stopped      01/08/2019 1947 metroNIDAZOLE (FLAGYL) IVPB (premix) 500 mg 500 mg Intravenous New Bag           Past Medical/Surgical History: Active Ambulatory Problems     Diagnosis Date Noted    Acute otitis externa of right ear 07/30/2018     Past Medical History:   Diagnosis Date    Depression        Admitting Diagnosis: Abscess [L02 91]  Diverticulitis [K57 92]  Constipation [K59 00]    Age/Sex: 48 y o  male    Assessment/Plan: This is a 48year old male from home with past medical history of depression who presents to ED with constipation and abdominal pain  Patient has leukocytosis of 11 31 and ct abdomen showed acute diverticulitis with pelvic abscess  Patient is admitted inpatient with acute diverticulitis with abscess, plan includes, NPO, IV antibiotics  IR consult  Pain control and monitor labs  Admission Orders: 1/8/2019  1924 INPATIENT   Scheduled Meds:   Current Facility-Administered Medications:  acetaminophen 650 mg Oral Q6H PRN    ampicillin-sulbactam 3 g Intravenous Q6H Last Rate: 3 g (01/09/19 0604)   enoxaparin 40 mg Subcutaneous Daily    HYDROmorphone 0 5 mg Intravenous Q3H PRN    HYDROmorphone 1 mg Intravenous Q4H PRN    lactated ringers 125 mL/hr Intravenous Continuous Last Rate: 125 mL/hr (01/08/19 2215)   metroNIDAZOLE 500 mg Intravenous Q8H Last Rate: 500 mg (01/09/19 0831)   nicotine 1 patch Transdermal Daily      Continuous Infusions:   lactated ringers 125 mL/hr Last Rate: 125 mL/hr (01/08/19 2215)     PRN Meds:     HYDROmorphone 0 5 IV - used x 1 (0831)    HYDROmorphone  1 mg IV - used x 1 (1728)    OTHER ORDERS:  scds  Up as tolerated   IR consult  NPO and Clears started 1/9      [address] Utilization Review Department  Phone: [phone]; Fax [phone]  [email or web address]  ATTENTION: Please call with any questions or concerns to [phone]  and carefully listen to the prompts so that you are directed to the right person  Send all requests for admission clinical reviews, approved or denied determinations and any other requests to fax [phone]   All voicemails are confidential

## 2019-01-09 NOTE — H&P
History and Physical -General Surgery  Amara Ramirez 48 y o  male MRN: [id number]  Unit/Bed#: ED 11 Encounter: [id number]        Reason for Consult / Principal Problem: abdominal pain/ constipation     HPI: Amara Ramirez is a 48y o  year old male who presents with 1 week of constipation and abdominal pain  He states the pain really evolved over the last day but the constipation has been for a week  Denies fevers/chills/nausea/vomiting  Never had diverticulitis before  Never had colonoscopy before  Pain is constant in his lower abdomen  Review of Systems   Constitutional: Positive for appetite change  Negative for activity change, chills, diaphoresis, fatigue, fever and unexpected weight change  HENT: Negative  Eyes: Negative  Respiratory: Negative  Cardiovascular: Negative  Gastrointestinal: Positive for abdominal pain and constipation  Endocrine: Negative  Genitourinary: Negative  Musculoskeletal: Negative  Skin: Negative  Allergic/Immunologic: Negative  Neurological: Negative  Hematological: Negative  Psychiatric/Behavioral: Negative  Historical Information   Past Medical History:   Diagnosis Date    Depression      History reviewed  No pertinent surgical history    Social History   History   Alcohol Use No     History   Drug Use No     Comment: last used crack/cocaine in 2000     History   Smoking Status    Current Every Day Smoker    Packs/day: 2 00    Types: Cigarettes   Smokeless Tobacco    Never Used     Family History   Problem Relation Age of Onset    No Known Problems Mother     No Known Problems Father        Meds/Allergies       (Not in a hospital admission)  Current Facility-Administered Medications   Medication Dose Route Frequency    ciprofloxacin (CIPRO) IVPB (premix) 400 mg  400 mg Intravenous Once    metroNIDAZOLE (FLAGYL) IVPB (premix) 500 mg  500 mg Intravenous Once       No Known Allergies    Objective     Blood pressure 117/77, pulse 84, temperature 98 5 °F (36 9 °C), temperature source Oral, resp  rate 18, weight 76 7 kg (169 lb), SpO2 99 %    No intake or output data in the 24 hours ending 01/08/19 1951    PHYSICAL EXAM  General appearance: alert and oriented, in no acute distress and alert  Skin: Skin color, texture, turgor normal  No rashes or lesions  Head: Normocephalic, without obvious abnormality, atraumatic  Heart: regular rate and rhythm, S1, S2 normal, no murmur, click, rub or gallop  Lungs: clear to auscultation bilaterally  Abdomen: soft, non-tender; bowel sounds normal; no masses,  no organomegaly  Back: negative  Rectal: deferred  Neurological: normal without focal findings, mental status, speech normal, alert and oriented x3, ELIZABETH and reflexes normal and symmetric    Lab Results:   Admission on 01/08/2019   Component Date Value    WBC 01/08/2019 11 31*    RBC 01/08/2019 5 54     Hemoglobin 01/08/2019 16 8     Hematocrit 01/08/2019 49 0     MCV 01/08/2019 88     MCH 01/08/2019 30 3     MCHC 01/08/2019 34 3     RDW 01/08/2019 12 0     MPV 01/08/2019 9 3     Platelets 59/57/4033 289     nRBC 01/08/2019 0     Neutrophils Relative 01/08/2019 68     Immat GRANS % 01/08/2019 0     Lymphocytes Relative 01/08/2019 12*    Monocytes Relative 01/08/2019 18*    Eosinophils Relative 01/08/2019 1     Basophils Relative 01/08/2019 1     Neutrophils Absolute 01/08/2019 7 74*    Immature Grans Absolute 01/08/2019 0 03     Lymphocytes Absolute 01/08/2019 1 35     Monocytes Absolute 01/08/2019 1 99*    Eosinophils Absolute 01/08/2019 0 13     Basophils Absolute 01/08/2019 0 07     Sodium 01/08/2019 135*    Potassium 01/08/2019 4 8     Chloride 01/08/2019 96*    CO2 01/08/2019 31     ANION GAP 01/08/2019 8     BUN 01/08/2019 16     Creatinine 01/08/2019 0 90     Glucose 01/08/2019 95     Calcium 01/08/2019 9 5     AST 01/08/2019 16     ALT 01/08/2019 40     Alkaline Phosphatase 01/08/2019 84     Total Protein 01/08/2019 7 7     Albumin 01/08/2019 3 5     Total Bilirubin 01/08/2019 0 50     eGFR 01/08/2019 99     Lipase 01/08/2019 94     Protime 01/08/2019 14 4*    INR 01/08/2019 1 15     PTT 01/08/2019 70*    Color, UA 01/08/2019 Yellow     Clarity, UA 01/08/2019 Clear     pH, UA 01/08/2019 5 5     Leukocytes, UA 01/08/2019 Negative     Nitrite, UA 01/08/2019 Negative     Protein, UA 01/08/2019 Negative     Glucose, UA 01/08/2019 Negative     Ketones, UA 01/08/2019 Negative     Urobilinogen, UA 01/08/2019 0 2     Bilirubin, UA 01/08/2019 Negative     Blood, UA 01/08/2019 Trace*    Specific Gravity, UA 01/08/2019 1 015     RBC, UA 01/08/2019 None Seen     WBC, UA 01/08/2019 None Seen     Epithelial Cells 01/08/2019 None Seen     Bacteria, UA 01/08/2019 Occasional      Imaging Studies: I have personally reviewed pertinent reports  Moderate colonic wall thickening and surrounding inflammatory stranding involving the mid to distal sigmoid colon with an associated adjacent 3 8 x 2 7 cm air and fluid collection  The findings are compatible with acute sigmoid diverticulitis with   pelvic abscess formation      No evidence of large or small bowel obstruction  ASSESSMENT/PLAN:  Problem List     Acute otitis externa of right ear    acute diverticulitis with abscess      -admit to surgery  -npo/ice chips  -unasyn  -IR consult  -pain control  -ambulate  -monitor labs    This patient will require  2 night hospital stay  Counseling / Coordination of Care  Total time spent today  30 minutes  Greater than 50% of total time was spent with the patient and / or family counseling and / or coordination of care

## 2019-01-09 NOTE — PLAN OF CARE
GASTROINTESTINAL - ADULT     Minimal or absence of nausea and/or vomiting Progressing     Maintains or returns to baseline bowel function Progressing     Maintains adequate nutritional intake Progressing

## 2019-01-09 NOTE — PROGRESS NOTES
Progress Note -Surgery PA  Afshan Aguilar 48 y o  male MRN: [id number]  Unit/Bed#: -01 Encounter: [id number]    ASSESSMENT/PLAN:  Problem List Acute diverticulitis with abscess  Tobacco use   47 yo M with first episode of acute diverticulitis  Pain remains the same  8/10  IR consult pending  WBC remains normal  · IVF  · Pain control  · IV abx  · NPO for IR aspiration and drain placement  · OOB  · Nicoderm patch   · Trend labs     VTE Pharmacologic Prophylaxis: Sequential compression device (Venodyne)  and Enoxaparin (Lovenox)    Subjective/Objective     Subjective: pain still the same      Objective/Physical Exam: Blood pressure 114/66, pulse (!) 53, temperature 98 5 °F (36 9 °C), temperature source Oral, resp  rate 18, weight 76 7 kg (169 lb), SpO2 94 %  ,Body mass index is 29 01 kg/m²  General appearance: alert and oriented, in no acute distress  Heart: regular rate and rhythm, S1, S2 normal, no murmur, click, rub or gallop  Lungs: clear to auscultation bilaterally  Abdomen: flat and soft, +BS, tender suprapubic region  no rebound or guarding     Neurological: normal without focal findings      Current Facility-Administered Medications:     acetaminophen (TYLENOL) tablet 650 mg, 650 mg, Oral, Q6H PRN, Светлана Diaz PA-C    ampicillin-sulbactam (UNASYN) 3 g in sodium chloride 0 9 % 100 mL IVPB, 3 g, Intravenous, Q6H, Светлана Diaz PA-C, Last Rate: 200 mL/hr at 01/09/19 0604, 3 g at 01/09/19 0604    enoxaparin (LOVENOX) subcutaneous injection 40 mg, 40 mg, Subcutaneous, Daily, Светлана Diaz PA-C    HYDROmorphone (DILAUDID) injection 0 5 mg, 0 5 mg, Intravenous, Q3H PRN, Willy Suleman PA-C    HYDROmorphone (DILAUDID) injection 1 mg, 1 mg, Intravenous, Q4H PRN, Willy Colace, PA-C, 1 mg at 01/09/19 0604    lactated ringers infusion, 125 mL/hr, Intravenous, Continuous, Willy Shell PA-C, Last Rate: 125 mL/hr at 01/08/19 2215, 125 mL/hr at 01/08/19 2215    metroNIDAZOLE (FLAGYL) IVPB (premix) 500 mg, 500 mg, Intravenous, Q8H, Brett Weaver MD    nicotine (NICODERM CQ) 14 mg/24hr TD 24 hr patch 1 patch, 1 patch, Transdermal, Daily, Hanh Russo PA-C      Lab, Imaging and other studies:   WBC 01/09/2019 9 35  4 31 - 10 16 Thousand/uL Final    RBC 01/09/2019 5 01  3 88 - 5 62 Million/uL Final    Hemoglobin 01/09/2019 14 8  12 0 - 17 0 g/dL Final    Hematocrit 01/09/2019 44 4  36 5 - 49 3 % Final    MCV 01/09/2019 89  82 - 98 fL Final    MCH 01/09/2019 29 5  26 8 - 34 3 pg Final    MCHC 01/09/2019 33 3  31 4 - 37 4 g/dL Final    RDW 01/09/2019 12 4  11 6 - 15 1 % Final    MPV 01/09/2019 9 2  8 9 - 12 7 fL Final    Platelets 34/78/0071 269  149 - 390 Thousands/uL Final    nRBC 01/09/2019 0  /100 WBCs Final    Neutrophils Relative 01/09/2019 62  43 - 75 % Final    Immat GRANS % 01/09/2019 0  0 - 2 % Final    Lymphocytes Relative 01/09/2019 15  14 - 44 % Final    Monocytes Relative 01/09/2019 20* 4 - 12 % Final    Eosinophils Relative 01/09/2019 2  0 - 6 % Final    Basophils Relative 01/09/2019 1  0 - 1 % Final    Neutrophils Absolute 01/09/2019 5 82  1 85 - 7 62 Thousands/µL Final    Immature Grans Absolute 01/09/2019 0 03  0 00 - 0 20 Thousand/uL Final    Lymphocytes Absolute 01/09/2019 1 38  0 60 - 4 47 Thousands/µL Final    Monocytes Absolute 01/09/2019 1 89* 0 17 - 1 22 Thousand/µL Final    Eosinophils Absolute 01/09/2019 0 17  0 00 - 0 61 Thousand/µL Final    Basophils Absolute 01/09/2019 0 06  0 00 - 0 10 Thousands/µL Final    Sodium 01/09/2019 136  136 - 145 mmol/L Final    Potassium 01/09/2019 4 2  3 5 - 5 3 mmol/L Final    Chloride 01/09/2019 100  100 - 108 mmol/L Final    CO2 01/09/2019 31  21 - 32 mmol/L Final    ANION GAP 01/09/2019 5  4 - 13 mmol/L Final    BUN 01/09/2019 13  5 - 25 mg/dL Final    Creatinine 01/09/2019 0 88  0 60 - 1 30 mg/dL Final    Glucose 01/09/2019 108  65 - 140 mg/dL Final    Calcium 01/09/2019 9 2  8 3 - 10 1 mg/dL Final  eGFR 01/09/2019 99  ml/min/1 73sq m Final

## 2019-01-10 LAB
ANION GAP SERPL CALCULATED.3IONS-SCNC: 7 MMOL/L (ref 4–13)
BASOPHILS # BLD AUTO: 0.04 THOUSANDS/ΜL (ref 0–0.1)
BASOPHILS NFR BLD AUTO: 0 % (ref 0–1)
BUN SERPL-MCNC: 10 MG/DL (ref 5–25)
CALCIUM SERPL-MCNC: 8.8 MG/DL (ref 8.3–10.1)
CHLORIDE SERPL-SCNC: 99 MMOL/L (ref 100–108)
CO2 SERPL-SCNC: 31 MMOL/L (ref 21–32)
CREAT SERPL-MCNC: 0.96 MG/DL (ref 0.6–1.3)
EOSINOPHIL # BLD AUTO: 0.09 THOUSAND/ΜL (ref 0–0.61)
EOSINOPHIL NFR BLD AUTO: 1 % (ref 0–6)
ERYTHROCYTE [DISTWIDTH] IN BLOOD BY AUTOMATED COUNT: 12.2 % (ref 11.6–15.1)
GFR SERPL CREATININE-BSD FRML MDRD: 91 ML/MIN/1.73SQ M
GLUCOSE SERPL-MCNC: 110 MG/DL (ref 65–140)
HCT VFR BLD AUTO: 39.1 % (ref 36.5–49.3)
HGB BLD-MCNC: 13.3 G/DL (ref 12–17)
IMM GRANULOCYTES # BLD AUTO: 0.04 THOUSAND/UL (ref 0–0.2)
IMM GRANULOCYTES NFR BLD AUTO: 0 % (ref 0–2)
LYMPHOCYTES # BLD AUTO: 0.85 THOUSANDS/ΜL (ref 0.6–4.47)
LYMPHOCYTES NFR BLD AUTO: 8 % (ref 14–44)
MCH RBC QN AUTO: 29.8 PG (ref 26.8–34.3)
MCHC RBC AUTO-ENTMCNC: 34 G/DL (ref 31.4–37.4)
MCV RBC AUTO: 88 FL (ref 82–98)
MONOCYTES # BLD AUTO: 1.99 THOUSAND/ΜL (ref 0.17–1.22)
MONOCYTES NFR BLD AUTO: 19 % (ref 4–12)
NEUTROPHILS # BLD AUTO: 7.76 THOUSANDS/ΜL (ref 1.85–7.62)
NEUTS SEG NFR BLD AUTO: 72 % (ref 43–75)
NRBC BLD AUTO-RTO: 0 /100 WBCS
PLATELET # BLD AUTO: 245 THOUSANDS/UL (ref 149–390)
PMV BLD AUTO: 9.3 FL (ref 8.9–12.7)
POTASSIUM SERPL-SCNC: 4.1 MMOL/L (ref 3.5–5.3)
RBC # BLD AUTO: 4.46 MILLION/UL (ref 3.88–5.62)
SODIUM SERPL-SCNC: 137 MMOL/L (ref 136–145)
WBC # BLD AUTO: 10.77 THOUSAND/UL (ref 4.31–10.16)

## 2019-01-10 PROCEDURE — 80048 BASIC METABOLIC PNL TOTAL CA: CPT | Performed by: SURGERY

## 2019-01-10 PROCEDURE — 85025 COMPLETE CBC W/AUTO DIFF WBC: CPT | Performed by: SURGERY

## 2019-01-10 RX ORDER — DEXTROSE, SODIUM CHLORIDE, AND POTASSIUM CHLORIDE 5; .45; .15 G/100ML; G/100ML; G/100ML
75 INJECTION INTRAVENOUS CONTINUOUS
Status: DISCONTINUED | OUTPATIENT
Start: 2019-01-10 | End: 2019-01-14

## 2019-01-10 RX ADMIN — SODIUM CHLORIDE 3 G: 9 INJECTION, SOLUTION INTRAVENOUS at 00:05

## 2019-01-10 RX ADMIN — NICOTINE 1 PATCH: 14 PATCH TRANSDERMAL at 08:30

## 2019-01-10 RX ADMIN — ENOXAPARIN SODIUM 40 MG: 40 INJECTION SUBCUTANEOUS at 08:29

## 2019-01-10 RX ADMIN — ACETAMINOPHEN 650 MG: 325 TABLET, FILM COATED ORAL at 20:42

## 2019-01-10 RX ADMIN — DEXTROSE, SODIUM CHLORIDE, AND POTASSIUM CHLORIDE 75 ML/HR: 5; .45; .15 INJECTION INTRAVENOUS at 16:59

## 2019-01-10 RX ADMIN — SODIUM CHLORIDE 3 G: 9 INJECTION, SOLUTION INTRAVENOUS at 19:43

## 2019-01-10 RX ADMIN — METRONIDAZOLE 500 MG: 500 INJECTION, SOLUTION INTRAVENOUS at 08:30

## 2019-01-10 RX ADMIN — HYDROMORPHONE HYDROCHLORIDE 1 MG: 1 INJECTION, SOLUTION INTRAMUSCULAR; INTRAVENOUS; SUBCUTANEOUS at 16:16

## 2019-01-10 RX ADMIN — SODIUM CHLORIDE 3 G: 9 INJECTION, SOLUTION INTRAVENOUS at 14:19

## 2019-01-10 RX ADMIN — METRONIDAZOLE 500 MG: 500 INJECTION, SOLUTION INTRAVENOUS at 16:02

## 2019-01-10 RX ADMIN — ACETAMINOPHEN 650 MG: 325 TABLET, FILM COATED ORAL at 06:36

## 2019-01-10 RX ADMIN — SODIUM CHLORIDE, SODIUM LACTATE, POTASSIUM CHLORIDE, AND CALCIUM CHLORIDE 125 ML/HR: .6; .31; .03; .02 INJECTION, SOLUTION INTRAVENOUS at 05:47

## 2019-01-10 RX ADMIN — SODIUM CHLORIDE 3 G: 9 INJECTION, SOLUTION INTRAVENOUS at 06:21

## 2019-01-10 NOTE — PROGRESS NOTES
Progress Note -Surgery JOSE Donato 48 y o  male MRN: [id number]  Unit/Bed#: -01 Encounter: [id number]    ASSESSMENT/PLAN:  Problem List     * (Principal)Diverticulitis of large intestine with abscess without bleeding    49 yo M with acute diverticulitis with abscess  Clinically improving today  +BM  · IV abx  · Pain control  · OOB  · Consider advancing diet         VTE Pharmacologic Prophylaxis: Sequential compression device (Venodyne)  and Enoxaparin (Lovenox)    Subjective/Objective     Subjective: no pain today  +BM and flatus   Denies F/C    Objective/Physical Exam: Blood pressure 112/66, pulse 73, temperature 97 8 °F (36 6 °C), temperature source Oral, resp  rate 18, weight 76 7 kg (169 lb), SpO2 95 %  ,Body mass index is 29 01 kg/m²      General appearance: alert  Heart: regular rate and rhythm, S1, S2 normal, no murmur, click, rub or gallop  Lungs: clear to auscultation bilaterally  Abdomen: soft, non-tender; bowel sounds normal; no masses,  no organomegaly      Current Facility-Administered Medications:     acetaminophen (TYLENOL) tablet 650 mg, 650 mg, Oral, Q6H PRN, Orvel Zana PA-C, 650 mg at 01/10/19 0636    ampicillin-sulbactam (UNASYN) 3 g in sodium chloride 0 9 % 100 mL IVPB, 3 g, Intravenous, Q6H, AMRITA RoblesC, Last Rate: 200 mL/hr at 01/10/19 0621, 3 g at 01/10/19 9901    enoxaparin (LOVENOX) subcutaneous injection 40 mg, 40 mg, Subcutaneous, Daily, JOSE Robles-C, 40 mg at 01/10/19 6455    HYDROmorphone (DILAUDID) injection 0 5 mg, 0 5 mg, Intravenous, Q3H PRN, Orvel Zana PA-C, 0 5 mg at 01/09/19 0831    HYDROmorphone (DILAUDID) injection 1 mg, 1 mg, Intravenous, Q4H PRN, Orvel Zana PA-C, 1 mg at 01/09/19 1625    lactated ringers infusion, 125 mL/hr, Intravenous, Continuous, Ormitch Spann PA-C, Last Rate: 125 mL/hr at 01/10/19 0547, 125 mL/hr at 01/10/19 0547    metroNIDAZOLE (FLAGYL) IVPB (premix) 500 mg, 500 mg, Intravenous, Q8H, Torrie Talavera Hoang Soler MD, Last Rate: 200 mL/hr at 01/10/19 0830, 500 mg at 01/10/19 0830    nicotine (NICODERM CQ) 14 mg/24hr TD 24 hr patch 1 patch, 1 patch, Transdermal, Daily, Mik Flannery PA-C, 1 patch at 01/10/19 0830    Lab, Imaging and other studies:            Ref Range & Units 1/10/19 0522   1/9/19 0601   1/8/19 1715      WBC 4 31 - 10 16 Thousand/uL 10 77   9 35  11 31      RBC 3 88 - 5 62 Million/uL 4 46  5 01  5 54     Hemoglobin 12 0 - 17 0 g/dL 13 3  14 8  16 8     Hematocrit 36 5 - 49 3 % 39 1  44 4  49 0     MCV 82 - 98 fL 88  89  88     MCH 26 8 - 34 3 pg 29 8  29 5  30 3     MCHC 31 4 - 37 4 g/dL 34 0  33 3  34 3     RDW 11 6 - 15 1 % 12 2  12 4  12 0     MPV 8 9 - 12 7 fL 9 3  9 2  9 3     Platelets 564 - 307 Thousands/uL 245  269  289     nRBC /100 WBCs 0  0  0     Neutrophils Relative 43 - 75 % 72  62  68     Immat GRANS % 0 - 2 % 0  0  0     Lymphocytes Relative 14 - 44 % 8   15  12      Monocytes Relative 4 - 12 % 19   20   18      Eosinophils Relative 0 - 6 % 1  2  1     Basophils Relative 0 - 1 % 0  1  1     Neutrophils Absolute 1 85 - 7 62 Thousands/µL 7 76   5 82  7 74      Immature Grans Absolute 0 00 - 0 20 Thousand/uL 0 04  0 03  0 03     Lymphocytes Absolute 0 60 - 4 47 Thousands/µL 0 85  1 38  1 35     Monocytes Absolute 0 17 - 1 22 Thousand/µL 1 99   1 89   1 99      Eosinophils Absolute 0 00 - 0 61 Thousand/µL 0 09  0 17  0 13     Basophils Absolute 0 00 - 0 10 Thousands/µL 0 04  0 06  0 07                             Ref Range & Units 1/10/19 0522   1/9/19 0601   1/8/19 1715      Sodium 136 - 145 mmol/L 137  136  135      Potassium 3 5 - 5 3 mmol/L 4 1  4 2  4 8     Chloride 100 - 108 mmol/L 99   100  96      CO2 21 - 32 mmol/L 31  31  31     ANION GAP 4 - 13 mmol/L 7  5  8     BUN 5 - 25 mg/dL 10  13  16     Creatinine 0 60 - 1 30 mg/dL 0 96  0 88CM  0 90CM    Comment: Standardized to IDMS reference method    Glucose 65 - 140 mg/dL 110  108CM  95CM    Comment:   If the patient is fasting, the ADA then defines impaired fasting glucose as > 100 mg/dL and diabetes as > or equal to 123 mg/dL  Specimen collection should occur prior to Sulfasalazine administration due to the potential for falsely depressed results  Specimen collection should occur prior to Sulfapyridine administration due to the potential for falsely elevated results      Calcium 8 3 - 10 1 mg/dL 8 8  9 2  9 5     eGFR ml/min/1 73sq m 91

## 2019-01-10 NOTE — PLAN OF CARE
Problem: GASTROINTESTINAL - ADULT  Goal: Minimal or absence of nausea and/or vomiting  INTERVENTIONS:  - Administer IV fluids as ordered to ensure adequate hydration  - Maintain NPO status until nausea and vomiting are resolved  - Nasogastric tube as ordered  - Administer ordered antiemetic medications as needed  - Provide nonpharmacologic comfort measures as appropriate  - Advance diet as tolerated, if ordered  - Nutrition services referral to assist patient with adequate nutrition and appropriate food choices   Outcome: Progressing    Goal: Maintains or returns to baseline bowel function  INTERVENTIONS:  - Assess bowel function  - Encourage oral fluids to ensure adequate hydration  - Administer IV fluids as ordered to ensure adequate hydration  - Administer ordered medications as needed  - Encourage mobilization and activity  - Nutrition services referral to assist patient with appropriate food choices   Outcome: Progressing    Goal: Maintains adequate nutritional intake  INTERVENTIONS:  - Monitor percentage of each meal consumed  - Identify factors contributing to decreased intake, treat as appropriate  - Assist with meals as needed  - Monitor I&O, WT and lab values  - Obtain nutrition services referral as needed   Outcome: Progressing

## 2019-01-11 LAB
ANION GAP SERPL CALCULATED.3IONS-SCNC: 8 MMOL/L (ref 4–13)
BASOPHILS # BLD AUTO: 0.06 THOUSANDS/ΜL (ref 0–0.1)
BASOPHILS NFR BLD AUTO: 1 % (ref 0–1)
BUN SERPL-MCNC: 7 MG/DL (ref 5–25)
CALCIUM SERPL-MCNC: 9.1 MG/DL (ref 8.3–10.1)
CHLORIDE SERPL-SCNC: 104 MMOL/L (ref 100–108)
CO2 SERPL-SCNC: 28 MMOL/L (ref 21–32)
CREAT SERPL-MCNC: 0.79 MG/DL (ref 0.6–1.3)
EOSINOPHIL # BLD AUTO: 0.16 THOUSAND/ΜL (ref 0–0.61)
EOSINOPHIL NFR BLD AUTO: 2 % (ref 0–6)
ERYTHROCYTE [DISTWIDTH] IN BLOOD BY AUTOMATED COUNT: 12.4 % (ref 11.6–15.1)
GFR SERPL CREATININE-BSD FRML MDRD: 104 ML/MIN/1.73SQ M
GLUCOSE SERPL-MCNC: 124 MG/DL (ref 65–140)
HCT VFR BLD AUTO: 41.1 % (ref 36.5–49.3)
HGB BLD-MCNC: 13.7 G/DL (ref 12–17)
IMM GRANULOCYTES # BLD AUTO: 0.04 THOUSAND/UL (ref 0–0.2)
IMM GRANULOCYTES NFR BLD AUTO: 0 % (ref 0–2)
LYMPHOCYTES # BLD AUTO: 1.38 THOUSANDS/ΜL (ref 0.6–4.47)
LYMPHOCYTES NFR BLD AUTO: 13 % (ref 14–44)
MCH RBC QN AUTO: 29.5 PG (ref 26.8–34.3)
MCHC RBC AUTO-ENTMCNC: 33.3 G/DL (ref 31.4–37.4)
MCV RBC AUTO: 88 FL (ref 82–98)
MONOCYTES # BLD AUTO: 1.6 THOUSAND/ΜL (ref 0.17–1.22)
MONOCYTES NFR BLD AUTO: 15 % (ref 4–12)
NEUTROPHILS # BLD AUTO: 7.56 THOUSANDS/ΜL (ref 1.85–7.62)
NEUTS SEG NFR BLD AUTO: 69 % (ref 43–75)
NRBC BLD AUTO-RTO: 0 /100 WBCS
PLATELET # BLD AUTO: 278 THOUSANDS/UL (ref 149–390)
PMV BLD AUTO: 9.8 FL (ref 8.9–12.7)
POTASSIUM SERPL-SCNC: 4 MMOL/L (ref 3.5–5.3)
RBC # BLD AUTO: 4.65 MILLION/UL (ref 3.88–5.62)
SODIUM SERPL-SCNC: 140 MMOL/L (ref 136–145)
WBC # BLD AUTO: 10.8 THOUSAND/UL (ref 4.31–10.16)

## 2019-01-11 PROCEDURE — 85025 COMPLETE CBC W/AUTO DIFF WBC: CPT | Performed by: SURGERY

## 2019-01-11 PROCEDURE — 80048 BASIC METABOLIC PNL TOTAL CA: CPT | Performed by: SURGERY

## 2019-01-11 RX ORDER — OXYCODONE HYDROCHLORIDE AND ACETAMINOPHEN 5; 325 MG/1; MG/1
2 TABLET ORAL EVERY 4 HOURS PRN
Status: DISCONTINUED | OUTPATIENT
Start: 2019-01-11 | End: 2019-01-14 | Stop reason: HOSPADM

## 2019-01-11 RX ORDER — OXYCODONE HYDROCHLORIDE AND ACETAMINOPHEN 5; 325 MG/1; MG/1
1 TABLET ORAL EVERY 4 HOURS PRN
Status: DISCONTINUED | OUTPATIENT
Start: 2019-01-11 | End: 2019-01-14 | Stop reason: HOSPADM

## 2019-01-11 RX ORDER — HYDROMORPHONE HCL/PF 1 MG/ML
0.5 SYRINGE (ML) INJECTION
Status: DISCONTINUED | OUTPATIENT
Start: 2019-01-11 | End: 2019-01-14 | Stop reason: HOSPADM

## 2019-01-11 RX ORDER — LANOLIN ALCOHOL/MO/W.PET/CERES
6 CREAM (GRAM) TOPICAL
Status: DISCONTINUED | OUTPATIENT
Start: 2019-01-11 | End: 2019-01-14 | Stop reason: HOSPADM

## 2019-01-11 RX ADMIN — ACETAMINOPHEN 650 MG: 325 TABLET, FILM COATED ORAL at 02:18

## 2019-01-11 RX ADMIN — SODIUM CHLORIDE 3 G: 9 INJECTION, SOLUTION INTRAVENOUS at 01:03

## 2019-01-11 RX ADMIN — OXYCODONE HYDROCHLORIDE AND ACETAMINOPHEN 1 TABLET: 5; 325 TABLET ORAL at 17:10

## 2019-01-11 RX ADMIN — METRONIDAZOLE 500 MG: 500 INJECTION, SOLUTION INTRAVENOUS at 15:31

## 2019-01-11 RX ADMIN — HYDROMORPHONE HYDROCHLORIDE 0.5 MG: 1 INJECTION, SOLUTION INTRAMUSCULAR; INTRAVENOUS; SUBCUTANEOUS at 12:07

## 2019-01-11 RX ADMIN — METRONIDAZOLE 500 MG: 500 INJECTION, SOLUTION INTRAVENOUS at 00:17

## 2019-01-11 RX ADMIN — SODIUM CHLORIDE 3 G: 9 INJECTION, SOLUTION INTRAVENOUS at 12:04

## 2019-01-11 RX ADMIN — SODIUM CHLORIDE 3 G: 9 INJECTION, SOLUTION INTRAVENOUS at 06:18

## 2019-01-11 RX ADMIN — HYDROMORPHONE HYDROCHLORIDE 1 MG: 1 INJECTION, SOLUTION INTRAMUSCULAR; INTRAVENOUS; SUBCUTANEOUS at 07:34

## 2019-01-11 RX ADMIN — HYDROMORPHONE HYDROCHLORIDE 0.5 MG: 1 INJECTION, SOLUTION INTRAMUSCULAR; INTRAVENOUS; SUBCUTANEOUS at 18:16

## 2019-01-11 RX ADMIN — HYDROMORPHONE HYDROCHLORIDE 1 MG: 1 INJECTION, SOLUTION INTRAMUSCULAR; INTRAVENOUS; SUBCUTANEOUS at 15:24

## 2019-01-11 RX ADMIN — METRONIDAZOLE 500 MG: 500 INJECTION, SOLUTION INTRAVENOUS at 07:34

## 2019-01-11 RX ADMIN — SODIUM CHLORIDE 3 G: 9 INJECTION, SOLUTION INTRAVENOUS at 18:27

## 2019-01-11 RX ADMIN — NICOTINE 1 PATCH: 14 PATCH TRANSDERMAL at 09:32

## 2019-01-11 RX ADMIN — DEXTROSE, SODIUM CHLORIDE, AND POTASSIUM CHLORIDE 75 ML/HR: 5; .45; .15 INJECTION INTRAVENOUS at 12:04

## 2019-01-11 RX ADMIN — MELATONIN 6 MG: at 21:50

## 2019-01-11 RX ADMIN — ENOXAPARIN SODIUM 40 MG: 40 INJECTION SUBCUTANEOUS at 09:29

## 2019-01-11 NOTE — PROGRESS NOTES
Progress Note -Surgery JOSE Schreiber 48 y o  male MRN: [id number]  Unit/Bed#: -01 Encounter: [id number]    ASSESSMENT/PLAN:  Problem List     * (Principal)Diverticulitis of large intestine with abscess without bleeding   49 yo M with acute diverticulitis with microperforation and abscess  Improved pain  Tolerating clears  · Advance diet to fulls/toast/crackers  · Continue IV abx  · OOB and ambulate  · Pain control PRN     VTE Pharmacologic Prophylaxis: Sequential compression device (Venodyne)  and Enoxaparin (Lovenox)    Subjective/Objective     Subjective: tolerating clears diet  No new complaints    Objective/Physical Exam: Blood pressure 119/67, pulse 67, temperature 98 °F (36 7 °C), temperature source Oral, resp  rate 18, weight 76 7 kg (169 lb), SpO2 95 %  ,Body mass index is 29 01 kg/m²      General appearance: alert and oriented, in no acute distress  Heart: regular rate and rhythm, S1, S2 normal, no murmur, click, rub or gallop  Lungs: clear to auscultation bilaterally  Abdomen: soft, non-tender; bowel sounds normal; no masses,  no organomegaly  Neurological: normal without focal findings      Current Facility-Administered Medications:     acetaminophen (TYLENOL) tablet 650 mg, 650 mg, Oral, Q6H PRN, Lillian Dunlap PA-C, 650 mg at 01/11/19 0218    ampicillin-sulbactam (UNASYN) 3 g in sodium chloride 0 9 % 100 mL IVPB, 3 g, Intravenous, Q6H, Светлана Diaz PA-C, Last Rate: 200 mL/hr at 01/11/19 0618, 3 g at 01/11/19 0618    dextrose 5 % and sodium chloride 0 45 % with KCl 20 mEq/L infusion, 75 mL/hr, Intravenous, Continuous, Contreras Childress MD, Last Rate: 75 mL/hr at 01/10/19 1659, 75 mL/hr at 01/10/19 1659    enoxaparin (LOVENOX) subcutaneous injection 40 mg, 40 mg, Subcutaneous, Daily, Светлана Diaz PA-C, 40 mg at 01/11/19 0929    HYDROmorphone (DILAUDID) injection 0 5 mg, 0 5 mg, Intravenous, Q3H PRN, Lillian Dunlap PA-C, 0 5 mg at 01/09/19 0831    HYDROmorphone (DILAUDID) injection 1 mg, 1 mg, Intravenous, Q4H PRN, Char Mukherjee PA-C, 1 mg at 01/11/19 0734    metroNIDAZOLE (FLAGYL) IVPB (premix) 500 mg, 500 mg, Intravenous, Q8H, Tee Danielson MD, Last Rate: 200 mL/hr at 01/11/19 0734, 500 mg at 01/11/19 0734    nicotine (NICODERM CQ) 14 mg/24hr TD 24 hr patch 1 patch, 1 patch, Transdermal, Daily, Char Mukherjee PA-C, 1 patch at 01/11/19 0932      Lab, Imaging and other studies:   WBC 01/11/2019 10 80* 4 31 - 10 16 Thousand/uL Final    RBC 01/11/2019 4 65  3 88 - 5 62 Million/uL Final    Hemoglobin 01/11/2019 13 7  12 0 - 17 0 g/dL Final    Hematocrit 01/11/2019 41 1  36 5 - 49 3 % Final    MCV 01/11/2019 88  82 - 98 fL Final    MCH 01/11/2019 29 5  26 8 - 34 3 pg Final    MCHC 01/11/2019 33 3  31 4 - 37 4 g/dL Final    RDW 01/11/2019 12 4  11 6 - 15 1 % Final    MPV 01/11/2019 9 8  8 9 - 12 7 fL Final    Platelets 97/40/7537 278  149 - 390 Thousands/uL Final    nRBC 01/11/2019 0  /100 WBCs Final    Neutrophils Relative 01/11/2019 69  43 - 75 % Final    Immat GRANS % 01/11/2019 0  0 - 2 % Final    Lymphocytes Relative 01/11/2019 13* 14 - 44 % Final    Monocytes Relative 01/11/2019 15* 4 - 12 % Final    Eosinophils Relative 01/11/2019 2  0 - 6 % Final    Basophils Relative 01/11/2019 1  0 - 1 % Final    Neutrophils Absolute 01/11/2019 7 56  1 85 - 7 62 Thousands/µL Final    Immature Grans Absolute 01/11/2019 0 04  0 00 - 0 20 Thousand/uL Final    Lymphocytes Absolute 01/11/2019 1 38  0 60 - 4 47 Thousands/µL Final    Monocytes Absolute 01/11/2019 1 60* 0 17 - 1 22 Thousand/µL Final    Eosinophils Absolute 01/11/2019 0 16  0 00 - 0 61 Thousand/µL Final    Basophils Absolute 01/11/2019 0 06  0 00 - 0 10 Thousands/µL Final    Sodium 01/11/2019 140  136 - 145 mmol/L Final    Potassium 01/11/2019 4 0  3 5 - 5 3 mmol/L Final    Chloride 01/11/2019 104  100 - 108 mmol/L Final    CO2 01/11/2019 28  21 - 32 mmol/L Final    ANION GAP 01/11/2019 8  4 - 13 mmol/L Final    BUN 01/11/2019 7  5 - 25 mg/dL Final    Creatinine 01/11/2019 0 79  0 60 - 1 30 mg/dL Final    Glucose 01/11/2019 124  65 - 140 mg/dL Final    Calcium 01/11/2019 9 1  8 3 - 10 1 mg/dL Final    eGFR 01/11/2019 104  ml/min/1 73sq m Final

## 2019-01-11 NOTE — UTILIZATION REVIEW
[address]  Utilization Review Department  Phone: [phone]; Fax [phone]  [email or web address]  ATTENTION: Please call with any questions or concerns to [phone]  and carefully listen to the prompts so that you are directed to the right person  Send all requests for admission clinical reviews, approved or denied determinations and any other requests to fax [phone]   All voicemails are confidential   Continued Stay Review    Date: 1/11/2019  Vital Signs: /67 (BP Location: Right arm)   Pulse 67   Temp 98 °F (36 7 °C) (Oral)   Resp 18   Wt 76 7 kg (169 lb)   SpO2 95%   BMI 29 01 kg/m²        Assessment/Plan:   Advance diet ot fulls with toast and crackers  Continue iv abx  Pain control and ambulate  Per patient increased gas pain with some distension      Medications:   Scheduled Meds:   Current Facility-Administered Medications:  acetaminophen 650 mg Oral Q6H PRN Joie Crew, PA-C    ampicillin-sulbactam 3 g Intravenous Q6H Joie Crew, PA-C Last Rate: 3 g (01/11/19 1204)   dextrose 5 % and sodium chloride 0 45 % with KCl 20 mEq/L 75 mL/hr Intravenous Continuous Michelle Pfeiffer MD Last Rate: 75 mL/hr (01/11/19 1204)   enoxaparin 40 mg Subcutaneous Daily JOSE Robles-KIKI    HYDROmorphone 0 5 mg Intravenous Q3H PRN Joie Crew, PA-C    HYDROmorphone 1 mg Intravenous Q4H PRN Joie Crew, PA-C    metroNIDAZOLE 500 mg Intravenous Q8H Michelle Pfeiffer MD Last Rate: 500 mg (01/11/19 0734)   nicotine 1 patch Transdermal Daily Joie Crew, PA-C      Continuous Infusions:   dextrose 5 % and sodium chloride 0 45 % with KCl 20 mEq/L 75 mL/hr Last Rate: 75 mL/hr (01/11/19 1204)     PRN Meds:   acetaminophen    HYDROmorphone    HYDROmorphone  Pertinent Labs/Diagnostic Results: wbc 10 80  Age/Sex: 48 y o  male   Discharge Plan: home with spouse

## 2019-01-12 LAB
ANION GAP SERPL CALCULATED.3IONS-SCNC: 7 MMOL/L (ref 4–13)
BASOPHILS # BLD AUTO: 0.06 THOUSANDS/ΜL (ref 0–0.1)
BASOPHILS NFR BLD AUTO: 1 % (ref 0–1)
BUN SERPL-MCNC: 6 MG/DL (ref 5–25)
CALCIUM SERPL-MCNC: 8.9 MG/DL (ref 8.3–10.1)
CHLORIDE SERPL-SCNC: 104 MMOL/L (ref 100–108)
CO2 SERPL-SCNC: 28 MMOL/L (ref 21–32)
CREAT SERPL-MCNC: 0.74 MG/DL (ref 0.6–1.3)
EOSINOPHIL # BLD AUTO: 0.18 THOUSAND/ΜL (ref 0–0.61)
EOSINOPHIL NFR BLD AUTO: 2 % (ref 0–6)
ERYTHROCYTE [DISTWIDTH] IN BLOOD BY AUTOMATED COUNT: 12.3 % (ref 11.6–15.1)
GFR SERPL CREATININE-BSD FRML MDRD: 107 ML/MIN/1.73SQ M
GLUCOSE SERPL-MCNC: 128 MG/DL (ref 65–140)
HCT VFR BLD AUTO: 39.9 % (ref 36.5–49.3)
HGB BLD-MCNC: 13.4 G/DL (ref 12–17)
IMM GRANULOCYTES # BLD AUTO: 0.06 THOUSAND/UL (ref 0–0.2)
IMM GRANULOCYTES NFR BLD AUTO: 1 % (ref 0–2)
LYMPHOCYTES # BLD AUTO: 1.72 THOUSANDS/ΜL (ref 0.6–4.47)
LYMPHOCYTES NFR BLD AUTO: 14 % (ref 14–44)
MCH RBC QN AUTO: 30.2 PG (ref 26.8–34.3)
MCHC RBC AUTO-ENTMCNC: 33.6 G/DL (ref 31.4–37.4)
MCV RBC AUTO: 90 FL (ref 82–98)
MONOCYTES # BLD AUTO: 1.28 THOUSAND/ΜL (ref 0.17–1.22)
MONOCYTES NFR BLD AUTO: 10 % (ref 4–12)
NEUTROPHILS # BLD AUTO: 9.09 THOUSANDS/ΜL (ref 1.85–7.62)
NEUTS SEG NFR BLD AUTO: 72 % (ref 43–75)
NRBC BLD AUTO-RTO: 0 /100 WBCS
PLATELET # BLD AUTO: 272 THOUSANDS/UL (ref 149–390)
PMV BLD AUTO: 9.7 FL (ref 8.9–12.7)
POTASSIUM SERPL-SCNC: 3.9 MMOL/L (ref 3.5–5.3)
RBC # BLD AUTO: 4.43 MILLION/UL (ref 3.88–5.62)
SODIUM SERPL-SCNC: 139 MMOL/L (ref 136–145)
WBC # BLD AUTO: 12.39 THOUSAND/UL (ref 4.31–10.16)

## 2019-01-12 PROCEDURE — 85025 COMPLETE CBC W/AUTO DIFF WBC: CPT | Performed by: SURGERY

## 2019-01-12 PROCEDURE — 80048 BASIC METABOLIC PNL TOTAL CA: CPT | Performed by: SURGERY

## 2019-01-12 RX ORDER — ONDANSETRON 2 MG/ML
4 INJECTION INTRAMUSCULAR; INTRAVENOUS EVERY 4 HOURS PRN
Status: DISCONTINUED | OUTPATIENT
Start: 2019-01-12 | End: 2019-01-14 | Stop reason: HOSPADM

## 2019-01-12 RX ADMIN — MELATONIN 6 MG: at 22:40

## 2019-01-12 RX ADMIN — NICOTINE 1 PATCH: 14 PATCH TRANSDERMAL at 08:41

## 2019-01-12 RX ADMIN — METRONIDAZOLE 500 MG: 500 INJECTION, SOLUTION INTRAVENOUS at 08:44

## 2019-01-12 RX ADMIN — OXYCODONE HYDROCHLORIDE AND ACETAMINOPHEN 1 TABLET: 5; 325 TABLET ORAL at 14:15

## 2019-01-12 RX ADMIN — OXYCODONE HYDROCHLORIDE AND ACETAMINOPHEN 1 TABLET: 5; 325 TABLET ORAL at 08:41

## 2019-01-12 RX ADMIN — METRONIDAZOLE 500 MG: 500 INJECTION, SOLUTION INTRAVENOUS at 22:40

## 2019-01-12 RX ADMIN — SODIUM CHLORIDE 3 G: 9 INJECTION, SOLUTION INTRAVENOUS at 05:29

## 2019-01-12 RX ADMIN — OXYCODONE HYDROCHLORIDE AND ACETAMINOPHEN 1 TABLET: 5; 325 TABLET ORAL at 00:15

## 2019-01-12 RX ADMIN — SODIUM CHLORIDE 3 G: 9 INJECTION, SOLUTION INTRAVENOUS at 12:44

## 2019-01-12 RX ADMIN — ENOXAPARIN SODIUM 40 MG: 40 INJECTION SUBCUTANEOUS at 08:41

## 2019-01-12 RX ADMIN — SODIUM CHLORIDE 3 G: 9 INJECTION, SOLUTION INTRAVENOUS at 00:54

## 2019-01-12 RX ADMIN — DEXTROSE, SODIUM CHLORIDE, AND POTASSIUM CHLORIDE 75 ML/HR: 5; .45; .15 INJECTION INTRAVENOUS at 08:44

## 2019-01-12 RX ADMIN — SODIUM CHLORIDE 3 G: 9 INJECTION, SOLUTION INTRAVENOUS at 17:17

## 2019-01-12 RX ADMIN — METRONIDAZOLE 500 MG: 500 INJECTION, SOLUTION INTRAVENOUS at 16:40

## 2019-01-12 RX ADMIN — METRONIDAZOLE 500 MG: 500 INJECTION, SOLUTION INTRAVENOUS at 00:11

## 2019-01-12 NOTE — PROGRESS NOTES
Progress Note - General Surgery   Nesha Donato 48 y o  male MRN: [id number]  Unit/Bed#: -01 Encounter: [id number]        Subjective/Objective     Subjective:  States he had a good night  Experiencing some gas pains this morning  Feels a little less bloated than yesterday  No nausea or vomiting  Small bowel movement    Blood pressure 100/62, pulse 72, temperature 98 2 °F (36 8 °C), temperature source Oral, resp  rate 18, weight 76 7 kg (169 lb), SpO2 96 %  ,Body mass index is 29 01 kg/m²  Intake/Output Summary (Last 24 hours) at 01/12/19 0756  Last data filed at 01/11/19 1019   Gross per 24 hour   Intake              360 ml   Output              900 ml   Net             -540 ml           Physical Exam:   Good bowel sounds throughout  Flat  Soft with only scant lower abdominal discomfort  No rebound or guarding    WBCs 12  No fever          Assessment:  Diverticulitis with pelvic abscess  This was not amenable to IR drainage  Clinically he does feel better today    Plan:   Will continue present antibiotics and clear liquids  May need to consider repeat CT scan  Continue Unasyn and Flagyl for now        Natalia Hurtado DO  1/12/2019  7:56 AM

## 2019-01-13 LAB
ANION GAP SERPL CALCULATED.3IONS-SCNC: 9 MMOL/L (ref 4–13)
BACTERIA BLD CULT: NORMAL
BACTERIA BLD CULT: NORMAL
BASOPHILS # BLD AUTO: 0.05 THOUSANDS/ΜL (ref 0–0.1)
BASOPHILS NFR BLD AUTO: 1 % (ref 0–1)
BUN SERPL-MCNC: 7 MG/DL (ref 5–25)
CALCIUM SERPL-MCNC: 8.8 MG/DL (ref 8.3–10.1)
CHLORIDE SERPL-SCNC: 105 MMOL/L (ref 100–108)
CO2 SERPL-SCNC: 27 MMOL/L (ref 21–32)
CREAT SERPL-MCNC: 0.84 MG/DL (ref 0.6–1.3)
EOSINOPHIL # BLD AUTO: 0.25 THOUSAND/ΜL (ref 0–0.61)
EOSINOPHIL NFR BLD AUTO: 3 % (ref 0–6)
ERYTHROCYTE [DISTWIDTH] IN BLOOD BY AUTOMATED COUNT: 12.4 % (ref 11.6–15.1)
GFR SERPL CREATININE-BSD FRML MDRD: 101 ML/MIN/1.73SQ M
GLUCOSE SERPL-MCNC: 127 MG/DL (ref 65–140)
HCT VFR BLD AUTO: 41.3 % (ref 36.5–49.3)
HGB BLD-MCNC: 13.7 G/DL (ref 12–17)
IMM GRANULOCYTES # BLD AUTO: 0.02 THOUSAND/UL (ref 0–0.2)
IMM GRANULOCYTES NFR BLD AUTO: 0 % (ref 0–2)
LYMPHOCYTES # BLD AUTO: 1.68 THOUSANDS/ΜL (ref 0.6–4.47)
LYMPHOCYTES NFR BLD AUTO: 23 % (ref 14–44)
MCH RBC QN AUTO: 29.5 PG (ref 26.8–34.3)
MCHC RBC AUTO-ENTMCNC: 33.2 G/DL (ref 31.4–37.4)
MCV RBC AUTO: 89 FL (ref 82–98)
MONOCYTES # BLD AUTO: 0.69 THOUSAND/ΜL (ref 0.17–1.22)
MONOCYTES NFR BLD AUTO: 9 % (ref 4–12)
NEUTROPHILS # BLD AUTO: 4.69 THOUSANDS/ΜL (ref 1.85–7.62)
NEUTS SEG NFR BLD AUTO: 64 % (ref 43–75)
NRBC BLD AUTO-RTO: 0 /100 WBCS
PLATELET # BLD AUTO: 296 THOUSANDS/UL (ref 149–390)
PMV BLD AUTO: 9.6 FL (ref 8.9–12.7)
POTASSIUM SERPL-SCNC: 3.9 MMOL/L (ref 3.5–5.3)
RBC # BLD AUTO: 4.65 MILLION/UL (ref 3.88–5.62)
SODIUM SERPL-SCNC: 141 MMOL/L (ref 136–145)
WBC # BLD AUTO: 7.38 THOUSAND/UL (ref 4.31–10.16)

## 2019-01-13 PROCEDURE — 85025 COMPLETE CBC W/AUTO DIFF WBC: CPT | Performed by: SURGERY

## 2019-01-13 PROCEDURE — 80048 BASIC METABOLIC PNL TOTAL CA: CPT | Performed by: SURGERY

## 2019-01-13 RX ADMIN — SODIUM CHLORIDE 3 G: 9 INJECTION, SOLUTION INTRAVENOUS at 01:07

## 2019-01-13 RX ADMIN — METRONIDAZOLE 500 MG: 500 INJECTION, SOLUTION INTRAVENOUS at 23:00

## 2019-01-13 RX ADMIN — SODIUM CHLORIDE 3 G: 9 INJECTION, SOLUTION INTRAVENOUS at 06:31

## 2019-01-13 RX ADMIN — SODIUM CHLORIDE 3 G: 9 INJECTION, SOLUTION INTRAVENOUS at 17:03

## 2019-01-13 RX ADMIN — METRONIDAZOLE 500 MG: 500 INJECTION, SOLUTION INTRAVENOUS at 15:17

## 2019-01-13 RX ADMIN — ENOXAPARIN SODIUM 40 MG: 40 INJECTION SUBCUTANEOUS at 09:57

## 2019-01-13 RX ADMIN — METRONIDAZOLE 500 MG: 500 INJECTION, SOLUTION INTRAVENOUS at 07:41

## 2019-01-13 RX ADMIN — SODIUM CHLORIDE 3 G: 9 INJECTION, SOLUTION INTRAVENOUS at 11:57

## 2019-01-13 RX ADMIN — NICOTINE 1 PATCH: 14 PATCH TRANSDERMAL at 09:59

## 2019-01-13 RX ADMIN — MELATONIN 6 MG: at 23:01

## 2019-01-13 NOTE — PROGRESS NOTES
Progress Note -Surgery PA  Selvin Castillo 48 y o  male MRN: [id number]  Unit/Bed#: -01 Encounter: [id number]      Subjective/Objective     Subjective:  Patient stating he feels much better  Tolerated clears yesterday  This passing gas and having bowel movements  Objective:     /64 (BP Location: Right arm)   Pulse 70   Temp 97 9 °F (36 6 °C) (Oral)   Resp 18   Wt 76 7 kg (169 lb)   SpO2 97%   BMI 29 01 kg/m²     No intake or output data in the 24 hours ending 01/13/19 0954    Invasive Devices     Peripheral Intravenous Line            Peripheral IV 01/10/19 Left Wrist 2 days                Physical Exam:  General appearance: alert and oriented, in no acute distress  Lungs: clear to auscultation bilaterally  Heart: regular rate and rhythm, S1, S2 normal, no murmur, click, rub or gallop  Abdomen: Soft, nontender to palpation  Bowel sounds positive        Current Facility-Administered Medications:     acetaminophen (TYLENOL) tablet 650 mg, 650 mg, Oral, Q6H PRN, Williams Palm PA-C, 650 mg at 01/11/19 0218    ampicillin-sulbactam (UNASYN) 3 g in sodium chloride 0 9 % 100 mL IVPB, 3 g, Intravenous, Q6H, Светлана Diaz PA-C, Last Rate: 200 mL/hr at 01/13/19 0631, 3 g at 01/13/19 0631    dextrose 5 % and sodium chloride 0 45 % with KCl 20 mEq/L infusion, 75 mL/hr, Intravenous, Continuous, Jaime Zavala MD, Last Rate: 75 mL/hr at 01/12/19 0844, 75 mL/hr at 01/12/19 0844    enoxaparin (LOVENOX) subcutaneous injection 40 mg, 40 mg, Subcutaneous, Daily, Светлана Diaz PA-C, 40 mg at 01/12/19 0841    HYDROmorphone (DILAUDID) injection 0 5 mg, 0 5 mg, Intravenous, Q3H PRN, Cara Rodríguez PA-C, 0 5 mg at 01/11/19 1816    melatonin tablet 6 mg, 6 mg, Oral, HS, Williams Palm PA-C, 6 mg at 01/12/19 2240    metroNIDAZOLE (FLAGYL) IVPB (premix) 500 mg, 500 mg, Intravenous, Q8H, Jaime Zavala MD, Last Rate: 200 mL/hr at 01/13/19 0741, 500 mg at 01/13/19 0741    nicotine ([facility]) 14 mg/24hr TD 24 hr patch 1 patch, 1 patch, Transdermal, Daily, Lillian Dunlap PA-C, 1 patch at 01/12/19 0841    ondansetron (ZOFRAN) injection 4 mg, 4 mg, Intravenous, Q4H PRN, Cara Rodríguez PA-C    oxyCODONE-acetaminophen (PERCOCET) 5-325 mg per tablet 1 tablet, 1 tablet, Oral, Q4H PRN, Jerris Fothergill, PA-C, 1 tablet at 01/12/19 1415    oxyCODONE-acetaminophen (PERCOCET) 5-325 mg per tablet 2 tablet, 2 tablet, Oral, Q4H PRN, Jerris Fothergill, PA-C              Lab, Imaging and other studies:  I have personally reviewed pertinent lab results  , CBC:   Lab Results   Component Value Date    WBC 7 38 01/13/2019    HGB 13 7 01/13/2019    HCT 41 3 01/13/2019    MCV 89 01/13/2019     01/13/2019    MCH 29 5 01/13/2019    MCHC 33 2 01/13/2019    RDW 12 4 01/13/2019    MPV 9 6 01/13/2019    NRBC 0 01/13/2019   , CMP:   Lab Results   Component Value Date    SODIUM 141 01/13/2019    K 3 9 01/13/2019     01/13/2019    CO2 27 01/13/2019    BUN 7 01/13/2019    CREATININE 0 84 01/13/2019    CALCIUM 8 8 01/13/2019    EGFR 101 01/13/2019     Labs in chart were reviewed  Lab Results   Component Value Date    WBC 7 38 01/13/2019    HGB 13 7 01/13/2019    HCT 41 3 01/13/2019     01/13/2019     Lab Results   Component Value Date    K 3 9 01/13/2019     01/13/2019    CO2 27 01/13/2019    BUN 7 01/13/2019    CREATININE 0 84 01/13/2019       VTE Pharmacologic Prophylaxis: Enoxaparin (Lovenox)  VTE Mechanical Prophylaxis: sequential compression device    Assessment:    72-year-old male presenting diverticulitis, abscess    Plan:    -will advance diet to fulls, toast, crackers  -encourage out of bed/ambulation  -continue antibiotics  - DVT ppx    Patient Active Problem List   Diagnosis    Acute otitis externa of right ear    Diverticulitis of large intestine with abscess without bleeding          This text is generated with voice recognition software   There may be translation, syntax,  or grammatical errors  If you have any questions, please contact the dictating provider      Smita Martinez PA-C

## 2019-01-14 ENCOUNTER — TRANSITIONAL CARE MANAGEMENT (OUTPATIENT)
Dept: FAMILY MEDICINE CLINIC | Facility: CLINIC | Age: 54
End: 2019-01-14

## 2019-01-14 VITALS
DIASTOLIC BLOOD PRESSURE: 61 MMHG | HEART RATE: 63 BPM | OXYGEN SATURATION: 97 % | SYSTOLIC BLOOD PRESSURE: 101 MMHG | WEIGHT: 169 LBS | TEMPERATURE: 97.9 F | RESPIRATION RATE: 18 BRPM | BODY MASS INDEX: 29.01 KG/M2

## 2019-01-14 LAB
ANION GAP SERPL CALCULATED.3IONS-SCNC: 8 MMOL/L (ref 4–13)
BASOPHILS # BLD AUTO: 0.07 THOUSANDS/ΜL (ref 0–0.1)
BASOPHILS NFR BLD AUTO: 1 % (ref 0–1)
BUN SERPL-MCNC: 7 MG/DL (ref 5–25)
CALCIUM SERPL-MCNC: 8.7 MG/DL (ref 8.3–10.1)
CHLORIDE SERPL-SCNC: 105 MMOL/L (ref 100–108)
CO2 SERPL-SCNC: 26 MMOL/L (ref 21–32)
CREAT SERPL-MCNC: 0.89 MG/DL (ref 0.6–1.3)
EOSINOPHIL # BLD AUTO: 0.28 THOUSAND/ΜL (ref 0–0.61)
EOSINOPHIL NFR BLD AUTO: 4 % (ref 0–6)
ERYTHROCYTE [DISTWIDTH] IN BLOOD BY AUTOMATED COUNT: 12.3 % (ref 11.6–15.1)
GFR SERPL CREATININE-BSD FRML MDRD: 99 ML/MIN/1.73SQ M
GLUCOSE SERPL-MCNC: 133 MG/DL (ref 65–140)
HCT VFR BLD AUTO: 41.6 % (ref 36.5–49.3)
HGB BLD-MCNC: 13.7 G/DL (ref 12–17)
IMM GRANULOCYTES # BLD AUTO: 0.1 THOUSAND/UL (ref 0–0.2)
IMM GRANULOCYTES NFR BLD AUTO: 1 % (ref 0–2)
LYMPHOCYTES # BLD AUTO: 1.91 THOUSANDS/ΜL (ref 0.6–4.47)
LYMPHOCYTES NFR BLD AUTO: 26 % (ref 14–44)
MCH RBC QN AUTO: 29.7 PG (ref 26.8–34.3)
MCHC RBC AUTO-ENTMCNC: 32.9 G/DL (ref 31.4–37.4)
MCV RBC AUTO: 90 FL (ref 82–98)
MONOCYTES # BLD AUTO: 0.72 THOUSAND/ΜL (ref 0.17–1.22)
MONOCYTES NFR BLD AUTO: 10 % (ref 4–12)
NEUTROPHILS # BLD AUTO: 4.42 THOUSANDS/ΜL (ref 1.85–7.62)
NEUTS SEG NFR BLD AUTO: 58 % (ref 43–75)
NRBC BLD AUTO-RTO: 0 /100 WBCS
PLATELET # BLD AUTO: 319 THOUSANDS/UL (ref 149–390)
PMV BLD AUTO: 10.1 FL (ref 8.9–12.7)
POTASSIUM SERPL-SCNC: 4.1 MMOL/L (ref 3.5–5.3)
RBC # BLD AUTO: 4.61 MILLION/UL (ref 3.88–5.62)
SODIUM SERPL-SCNC: 139 MMOL/L (ref 136–145)
WBC # BLD AUTO: 7.5 THOUSAND/UL (ref 4.31–10.16)

## 2019-01-14 PROCEDURE — 85025 COMPLETE CBC W/AUTO DIFF WBC: CPT | Performed by: PHYSICIAN ASSISTANT

## 2019-01-14 PROCEDURE — 80048 BASIC METABOLIC PNL TOTAL CA: CPT | Performed by: PHYSICIAN ASSISTANT

## 2019-01-14 RX ORDER — METRONIDAZOLE 250 MG/1
250 TABLET ORAL EVERY 8 HOURS SCHEDULED
Qty: 21 TABLET | Refills: 0 | Status: SHIPPED | OUTPATIENT
Start: 2019-01-14 | End: 2019-01-21

## 2019-01-14 RX ORDER — AMOXICILLIN AND CLAVULANATE POTASSIUM 875; 125 MG/1; MG/1
1 TABLET, FILM COATED ORAL EVERY 12 HOURS SCHEDULED
Qty: 14 TABLET | Refills: 0 | Status: SHIPPED | OUTPATIENT
Start: 2019-01-14 | End: 2019-01-21

## 2019-01-14 RX ADMIN — DEXTROSE, SODIUM CHLORIDE, AND POTASSIUM CHLORIDE 75 ML/HR: 5; .45; .15 INJECTION INTRAVENOUS at 00:00

## 2019-01-14 RX ADMIN — SODIUM CHLORIDE 3 G: 9 INJECTION, SOLUTION INTRAVENOUS at 00:00

## 2019-01-14 RX ADMIN — NICOTINE 1 PATCH: 14 PATCH TRANSDERMAL at 09:08

## 2019-01-14 RX ADMIN — SODIUM CHLORIDE 3 G: 9 INJECTION, SOLUTION INTRAVENOUS at 05:11

## 2019-01-14 RX ADMIN — ENOXAPARIN SODIUM 40 MG: 40 INJECTION SUBCUTANEOUS at 09:08

## 2019-01-14 RX ADMIN — METRONIDAZOLE 500 MG: 500 INJECTION, SOLUTION INTRAVENOUS at 09:06

## 2019-01-14 NOTE — SOCIAL WORK
LOS 6 DAYS  PATIENT IS NOT A BUNDLE  PATIENT IS NOT A READMISSION  Patient reported to CM that he lives in [facility] with his spouse and son  Patient works for a [address]  Patient denies DME use, VNA uses, and denies Hx of rehab  Patient reported to CM that his spouse is POA, he works for [address], drives, and will have no needs before discharge  Patient is tentative discharge later today pending diet toleration  CM will continue to follow through discharge  CM reviewed d/c planning process including the following: identifying help at home, patient preference for d/c planning needs, Discharge Lounge, [facility] Meds to Bed program, availability of treatment team to discuss questions or concerns patient and/or family may have regarding understanding medications and recognizing signs and symptoms once discharged  CM also encouraged patient to follow up with all recommended appointments after discharge  Patient advised of importance for patient and family to participate in managing patients medical well being  Cm will continue to follow through discharge

## 2019-01-14 NOTE — PLAN OF CARE
Problem: DISCHARGE PLANNING - CARE MANAGEMENT  Goal: Discharge to post-acute care or home with appropriate resources  INTERVENTIONS:  - Conduct assessment to determine patient/family and health care team treatment goals, and need for post-acute services based on payer coverage, community resources, and patient preferences, and barriers to discharge  - Address psychosocial, clinical, and financial barriers to discharge as identified in assessment in conjunction with the patient/family and health care team  - Arrange appropriate level of post-acute services according to patients   needs and preference and payer coverage in collaboration with the physician and health care team  - Communicate with and update the patient/family, physician, and health care team regarding progress on the discharge plan  - Arrange appropriate transportation to post-acute venues  Outcome: Progressing  LOS 6 DAYS  PATIENT IS NOT A BUNDLE  PATIENT IS NOT A READMISSION  Patient reported to  that he lives in [facility] with his spouse and son  Patient works for a [address]  Patient denies DME use, VNA uses, and denies Hx of rehab  Patient reported to  that his spouse is POA, he works for [address], drives, and will have no needs before discharge  Patient is tentative discharge later today pending diet toleration  CM will continue to follow through discharge  CM reviewed d/c planning process including the following: identifying help at home, patient preference for d/c planning needs, [facility]ge[facility], availability of treatment team to discuss questions or concerns patient and/or family may have regarding understanding medications and recognizing signs and symptoms once discharged  CM also encouraged patient to follow up with all recommended appointments after discharge  Patient advised of importance for patient and family to participate in managing patients medical well being        Cm will continue to follow through discharge

## 2019-01-14 NOTE — DISCHARGE SUMMARY
Discharge Summary - Afshan Aguilar 48 y o  male MRN: [id number]    Unit/Bed#: -01 Encounter: [id number]    Admission Date: 1/8/2019   Discharge Date: 1/14/2019    Admitting Diagnosis:   Abscess [L02 91]  Diverticulitis [K57 92]  Constipation [K59 00]    Discharge Diagnoses: Principal Problem:    Diverticulitis of large intestine with abscess without bleeding      Consultations:  Interventional radiology    Procedures Performed: none    Hospital Course: Afshan Aguilar is a 48 y o  male admitted on 1/8/19 for diverticulitis with abscess noted on CT scan after 1 week of constipation and abdominal pain  Patient was placed NPO and started on Unasyn and Flagyl  IR was consulted and determined the abscess was not amenable to drainage  Hospital day 2 and 3 consisted of clear liquid diet and pain control  Patient improved with only mild abdominal distention, and advanced diet on hospital day 4  Patient continued to have mild gas pain but continued to have bowel movements  Today patient tolerated a regular diet and has zero pain and ready for discharge  Discussed with patient need for low residue/low fiber diet for next couple weeks before progressively adding more fiber into his daily diet  Sent home on Augmentin and Flagyl to complete a two week course  Condition at Discharge: good     Discharge instructions/Information to patient and family:   See after visit summary for information provided to patient and family  Provisions for Follow-Up Care:  See after visit summary for information related to follow-up care and any pertinent home health orders  Disposition: Home    Planned Readmission: No    Discharge Statement   I spent 20 minutes discharging the patient  This time was spent on the day of discharge  I had direct contact with the patient on the day of discharge  Additional documentation is required if more than 30 minutes were spent on discharge       Discharge Medications:  See after visit summary for reconciled discharge medications provided to patient and family

## 2019-01-14 NOTE — DISCHARGE INSTRUCTIONS
Please stick to soft, low fiber diet for at least two weeks before starting to implement more fiber into your diet  Follow up in one month to discuss future colonoscopy  Diverticulitis   WHAT YOU NEED TO KNOW:   Diverticulitis is a condition that causes small pockets along your intestine called diverticula to become inflamed or infected  This is caused by hard bowel movements, food, or bacteria that get stuck in the pockets  DISCHARGE INSTRUCTIONS:   Seek care immediately if:   · You have bowel movement or foul-smelling discharge leaking from your vagina or in your urine  · You have severe diarrhea  · You urinate less than usual or not at all  · You are not able to have a bowel movement  · You cannot stop vomiting  · You have severe abdominal pain, a fever, and your abdomen is larger than usual      · You have new or increased blood in your bowel movements  Contact your healthcare provider if:   · You have pain when you urinate  · Your symptoms get worse or do not go away  · You have questions or concerns about your condition or care  Medicines:   · Antibiotics  may be given to help prevent or treat a bacterial infection  · Take your medicine as directed  Contact your healthcare provider if you think your medicine is not helping or if you have side effects  Tell him or her if you are allergic to any medicine  Keep a list of the medicines, vitamins, and herbs you take  Include the amounts, and when and why you take them  Bring the list or the pill bottles to follow-up visits  Carry your medicine list with you in case of an emergency  · Follow up with your healthcare provider as directed: You may need to return for a colonoscopy  When your symptoms are gone, you may need a low-fat, high-fiber diet to help prevent diverticulitis from developing again  Your healthcare provider or dietitian can help you create meal plans   Write down your questions so you remember to ask them during your visits  © 2017 [employer] Information is for End User's use only and may not be sold, redistributed or otherwise used for commercial purposes  All illustrations and images included in CareNotes® are the copyrighted property of A D [employer] M [employer] Inc  or [employer]  The above information is an  only  It is not intended as medical advice for individual conditions or treatments  Talk to your doctor, nurse or pharmacist before following any medical regimen to see if it is safe and effective for you

## 2019-01-14 NOTE — PROGRESS NOTES
Progress Note - General Surgery   Brady Rueda 48 y o  male MRN: [id number]  Unit/Bed#: -01 Encounter: [id number]    Assessment:  Diverticulitis with abscess  Day 7 of Flagyl and Unasyn     Plan:  Advance diet to regular house  Discontinue IV fluids  Will prescribe outpatient abx  May discharge later today if tolerates breakfast        Subjective/Objective     Subjective:  Patient states he is doing well, passing gas, and continued to have bowel movements yesterday  Denies any nausea, vomiting, abdominal pain, fevers, chills, SOB, chest pain, leg pain  Patient has not had any pain medication in almost two days  Patient ready to go home  Objective:     Blood pressure 101/61, pulse 63, temperature 97 9 °F (36 6 °C), temperature source Oral, resp  rate 18, weight 76 7 kg (169 lb), SpO2 97 %  ,Body mass index is 29 01 kg/m²  Intake/Output Summary (Last 24 hours) at 01/14/19 0848  Last data filed at 01/14/19 0736   Gross per 24 hour   Intake             1660 ml   Output                0 ml   Net             1660 ml       Invasive Devices     Peripheral Intravenous Line            Peripheral IV 01/10/19 Left Wrist 3 days    Peripheral IV 01/14/19 Right Hand less than 1 day                Physical Exam: General appearance: alert and oriented, in no acute distress  Lungs: clear to auscultation bilaterally  Heart: regular rate and rhythm  Abdomen: soft, non-tender; bowel sounds normal; no masses,  no organomegaly    Lab, Imaging and other studies:  I have personally reviewed pertinent lab results    , CBC:   Lab Results   Component Value Date    WBC 7 50 01/14/2019    HGB 13 7 01/14/2019    HCT 41 6 01/14/2019    MCV 90 01/14/2019     01/14/2019    MCH 29 7 01/14/2019    MCHC 32 9 01/14/2019    RDW 12 3 01/14/2019    MPV 10 1 01/14/2019    NRBC 0 01/14/2019   , CMP:   Lab Results   Component Value Date    SODIUM 139 01/14/2019    K 4 1 01/14/2019     01/14/2019    CO2 26 01/14/2019    BUN 7 01/14/2019    CREATININE 0 89 01/14/2019    CALCIUM 8 7 01/14/2019    EGFR 99 01/14/2019     VTE Pharmacologic Prophylaxis: Enoxaparin (Lovenox)  VTE Mechanical Prophylaxis: sequential compression device

## 2020-01-19 ENCOUNTER — HOSPITAL ENCOUNTER (EMERGENCY)
Facility: HOSPITAL | Age: 55
Discharge: HOME | End: 2020-01-19
Attending: EMERGENCY MEDICINE | Admitting: EMERGENCY MEDICINE
Payer: COMMERCIAL

## 2020-01-19 VITALS
HEIGHT: 66 IN | DIASTOLIC BLOOD PRESSURE: 79 MMHG | OXYGEN SATURATION: 98 % | TEMPERATURE: 98.3 F | BODY MASS INDEX: 26.93 KG/M2 | RESPIRATION RATE: 16 BRPM | SYSTOLIC BLOOD PRESSURE: 118 MMHG | WEIGHT: 167.55 LBS | HEART RATE: 86 BPM

## 2020-01-19 DIAGNOSIS — N30.91 HEMORRHAGIC CYSTITIS: Primary | ICD-10-CM

## 2020-01-19 LAB
ALBUMIN SERPL BCP-MCNC: 3.1 G/DL (ref 3.5–5)
ALP SERPL-CCNC: 70 U/L (ref 46–116)
ALT SERPL W P-5'-P-CCNC: 41 U/L (ref 12–78)
ANION GAP SERPL CALCULATED.3IONS-SCNC: 7 MMOL/L (ref 4–13)
AST SERPL W P-5'-P-CCNC: 26 U/L (ref 5–45)
BACTERIA UR QL AUTO: ABNORMAL /HPF
BASOPHILS # BLD AUTO: 0.04 THOUSANDS/ΜL (ref 0–0.1)
BASOPHILS NFR BLD AUTO: 1 % (ref 0–1)
BILIRUB SERPL-MCNC: 0.42 MG/DL (ref 0.2–1)
BILIRUB UR QL STRIP: ABNORMAL
BUN SERPL-MCNC: 10 MG/DL (ref 5–25)
CALCIUM SERPL-MCNC: 8.4 MG/DL (ref 8.3–10.1)
CHLORIDE SERPL-SCNC: 104 MMOL/L (ref 100–108)
CLARITY UR: ABNORMAL
CO2 SERPL-SCNC: 29 MMOL/L (ref 21–32)
COLOR UR: ABNORMAL
CREAT SERPL-MCNC: 0.89 MG/DL (ref 0.6–1.3)
EOSINOPHIL # BLD AUTO: 0.13 THOUSAND/ΜL (ref 0–0.61)
EOSINOPHIL NFR BLD AUTO: 2 % (ref 0–6)
ERYTHROCYTE [DISTWIDTH] IN BLOOD BY AUTOMATED COUNT: 12.3 % (ref 11.6–15.1)
GFR SERPL CREATININE-BSD FRML MDRD: 98 ML/MIN/1.73SQ M
GLUCOSE SERPL-MCNC: 116 MG/DL (ref 65–140)
GLUCOSE UR STRIP-MCNC: NEGATIVE MG/DL
HCT VFR BLD AUTO: 45.1 % (ref 36.5–49.3)
HGB BLD-MCNC: 15.3 G/DL (ref 12–17)
HGB UR QL STRIP.AUTO: ABNORMAL
IMM GRANULOCYTES # BLD AUTO: 0.01 THOUSAND/UL (ref 0–0.2)
IMM GRANULOCYTES NFR BLD AUTO: 0 % (ref 0–2)
KETONES UR STRIP-MCNC: NEGATIVE MG/DL
LEUKOCYTE ESTERASE UR QL STRIP: ABNORMAL
LYMPHOCYTES # BLD AUTO: 0.8 THOUSANDS/ΜL (ref 0.6–4.47)
LYMPHOCYTES NFR BLD AUTO: 14 % (ref 14–44)
MCH RBC QN AUTO: 30.5 PG (ref 26.8–34.3)
MCHC RBC AUTO-ENTMCNC: 33.9 G/DL (ref 31.4–37.4)
MCV RBC AUTO: 90 FL (ref 82–98)
MONOCYTES # BLD AUTO: 0.92 THOUSAND/ΜL (ref 0.17–1.22)
MONOCYTES NFR BLD AUTO: 17 % (ref 4–12)
NEUTROPHILS # BLD AUTO: 3.67 THOUSANDS/ΜL (ref 1.85–7.62)
NEUTS SEG NFR BLD AUTO: 66 % (ref 43–75)
NITRITE UR QL STRIP: POSITIVE
NON-SQ EPI CELLS URNS QL MICRO: ABNORMAL /HPF
NRBC BLD AUTO-RTO: 0 /100 WBCS
PH UR STRIP.AUTO: 5.5 [PH] (ref 4.5–8)
PLATELET # BLD AUTO: 181 THOUSANDS/UL (ref 149–390)
PMV BLD AUTO: 10.2 FL (ref 8.9–12.7)
POTASSIUM SERPL-SCNC: 3.9 MMOL/L (ref 3.5–5.3)
PROT SERPL-MCNC: 6.6 G/DL (ref 6.4–8.2)
PROT UR STRIP-MCNC: ABNORMAL MG/DL
RBC # BLD AUTO: 5.01 MILLION/UL (ref 3.88–5.62)
RBC #/AREA URNS AUTO: ABNORMAL /HPF
SODIUM SERPL-SCNC: 140 MMOL/L (ref 136–145)
SP GR UR STRIP.AUTO: >=1.03 (ref 1–1.03)
UROBILINOGEN UR QL STRIP.AUTO: 1 E.U./DL
WBC # BLD AUTO: 5.57 THOUSAND/UL (ref 4.31–10.16)
WBC #/AREA URNS AUTO: ABNORMAL /HPF

## 2020-01-19 PROCEDURE — 36415 COLL VENOUS BLD VENIPUNCTURE: CPT | Performed by: EMERGENCY MEDICINE

## 2020-01-19 PROCEDURE — 99283 EMERGENCY DEPT VISIT LOW MDM: CPT

## 2020-01-19 PROCEDURE — 99284 EMERGENCY DEPT VISIT MOD MDM: CPT | Performed by: EMERGENCY MEDICINE

## 2020-01-19 PROCEDURE — 87186 SC STD MICRODIL/AGAR DIL: CPT

## 2020-01-19 PROCEDURE — 81001 URINALYSIS AUTO W/SCOPE: CPT

## 2020-01-19 PROCEDURE — 80053 COMPREHEN METABOLIC PANEL: CPT | Performed by: EMERGENCY MEDICINE

## 2020-01-19 PROCEDURE — 85025 COMPLETE CBC W/AUTO DIFF WBC: CPT | Performed by: EMERGENCY MEDICINE

## 2020-01-19 PROCEDURE — 87077 CULTURE AEROBIC IDENTIFY: CPT

## 2020-01-19 PROCEDURE — 87086 URINE CULTURE/COLONY COUNT: CPT

## 2020-01-19 RX ORDER — CEPHALEXIN 250 MG/1
500 CAPSULE ORAL ONCE
Status: COMPLETED | OUTPATIENT
Start: 2020-01-19 | End: 2020-01-19

## 2020-01-19 RX ORDER — CEPHALEXIN 500 MG/1
500 CAPSULE ORAL EVERY 6 HOURS SCHEDULED
Qty: 28 CAPSULE | Refills: 0 | Status: SHIPPED | OUTPATIENT
Start: 2020-01-19 | End: 2020-01-26

## 2020-01-19 RX ADMIN — CEPHALEXIN 500 MG: 250 CAPSULE ORAL at 14:31

## 2020-01-19 NOTE — ED PROVIDER NOTES
History  Chief Complaint   Patient presents with    Blood in Urine     Pt presents to the ED with hematuria onset around 1100 today  HPI     48year old male with history of diverticulitis presenting for evaluation of 3 days frequency with dysuria, accompanied by chills, and 12 hours of hematuria  Reports a burning sensation when he urinates  No low back pain  No fevers but he has had chills  States that he has passed a couple of blood clots per his urethra  No abdominal pain  Denies nausea or vomiting  He had some body aches a few days ago that have since resolved  No history of UTIs  He does smoke  Prior to Admission Medications   Prescriptions Last Dose Informant Patient Reported? Taking?   naproxen (NAPROSYN) 500 mg tablet   No No   Sig: Take 1 tablet (500 mg total) by mouth 2 (two) times a day as needed for mild pain      Facility-Administered Medications: None       Past Medical History:   Diagnosis Date    Depression     Diverticulitis of large intestine with abscess without bleeding        History reviewed  No pertinent surgical history  Family History   Problem Relation Age of Onset    No Known Problems Mother     No Known Problems Father      I have reviewed and agree with the history as documented  Social History     Tobacco Use    Smoking status: Current Every Day Smoker     Packs/day: 2 00     Types: Cigarettes    Smokeless tobacco: Never Used   Substance Use Topics    Alcohol use: No    Drug use: No     Comment: last used crack/cocaine in 2000        Review of Systems   Constitutional: Positive for chills  Negative for fever  HENT: Negative for congestion  Eyes: Negative for visual disturbance  Respiratory: Negative for cough and shortness of breath  Cardiovascular: Negative for chest pain and leg swelling  Gastrointestinal: Negative for abdominal pain, diarrhea, nausea and vomiting  Genitourinary: Positive for dysuria, frequency, hematuria and urgency  Negative for flank pain, penile pain, scrotal swelling and testicular pain  Musculoskeletal: Negative for arthralgias, back pain, neck pain and neck stiffness  Skin: Negative for rash  Neurological: Negative for weakness, numbness and headaches  Psychiatric/Behavioral: Negative for agitation, behavioral problems and confusion  Physical Exam  Physical Exam   Constitutional: He is oriented to person, place, and time  He appears well-developed and well-nourished  No distress  HENT:   Head: Normocephalic and atraumatic  Right Ear: External ear normal    Left Ear: External ear normal    Nose: Nose normal    Mouth/Throat: Oropharynx is clear and moist    Eyes: Conjunctivae are normal    Neck: Normal range of motion  Neck supple  Cardiovascular: Normal rate, regular rhythm and normal heart sounds  Exam reveals no gallop and no friction rub  No murmur heard  Pulmonary/Chest: Effort normal and breath sounds normal  No respiratory distress  He has no wheezes  He has no rales  Abdominal: Soft  Bowel sounds are normal  He exhibits no distension  There is no tenderness  There is no guarding  Hernia confirmed negative in the right inguinal area and confirmed negative in the left inguinal area  No CVA tenderness, abdomen benign   Genitourinary: Testes normal and penis normal  Right testis shows no mass, no swelling and no tenderness  Left testis shows no mass, no swelling and no tenderness  Circumcised  Musculoskeletal: Normal range of motion  He exhibits no edema or deformity  Lymphadenopathy: No inguinal adenopathy noted on the right or left side  Neurological: He is alert and oriented to person, place, and time  He exhibits normal muscle tone  Skin: Skin is warm and dry  He is not diaphoretic         Vital Signs  ED Triage Vitals [01/19/20 1308]   Temperature Pulse Respirations Blood Pressure SpO2   98 3 °F (36 8 °C) 86 16 118/79 98 %      Temp Source Heart Rate Source Patient Position - Orthostatic VS BP Location FiO2 (%)   Oral Monitor Sitting Right arm --      Pain Score       6           Vitals:    01/19/20 1308   BP: 118/79   Pulse: 86   Patient Position - Orthostatic VS: Sitting         Visual Acuity      ED Medications  Medications   cephalexin (KEFLEX) capsule 500 mg (500 mg Oral Given 1/19/20 1431)       Diagnostic Studies  Results Reviewed     Procedure Component Value Units Date/Time    Comprehensive metabolic panel [[id number]]  (Abnormal) Collected:  01/19/20 1351    Lab Status:  Final result Specimen:  Blood from Arm, Right Updated:  01/19/20 1414     Sodium 140 mmol/L      Potassium 3 9 mmol/L      Chloride 104 mmol/L      CO2 29 mmol/L      ANION GAP 7 mmol/L      BUN 10 mg/dL      Creatinine 0 89 mg/dL      Glucose 116 mg/dL      Calcium 8 4 mg/dL      AST 26 U/L      ALT 41 U/L      Alkaline Phosphatase 70 U/L      Total Protein 6 6 g/dL      Albumin 3 1 g/dL      Total Bilirubin 0 42 mg/dL      eGFR 98 ml/min/1 73sq m     Narrative:       [place] guidelines for Chronic Kidney Disease (CKD):     Stage 1 with normal or high GFR (GFR > 90 mL/min/1 73 square meters)    Stage 2 Mild CKD (GFR = 60-89 mL/min/1 73 square meters)    Stage 3A Moderate CKD (GFR = 45-59 mL/min/1 73 square meters)    Stage 3B Moderate CKD (GFR = 30-44 mL/min/1 73 square meters)    Stage 4 Severe CKD (GFR = 15-29 mL/min/1 73 square meters)    Stage 5 End Stage CKD (GFR <15 mL/min/1 73 square meters)  Note: GFR calculation is accurate only with a steady state creatinine    CBC and differential [[id number]]  (Abnormal) Collected:  01/19/20 1351    Lab Status:  Final result Specimen:  Blood from Arm, Right Updated:  01/19/20 1359     WBC 5 57 Thousand/uL      RBC 5 01 Million/uL      Hemoglobin 15 3 g/dL      Hematocrit 45 1 %      MCV 90 fL      MCH 30 5 pg      MCHC 33 9 g/dL      RDW 12 3 %      MPV 10 2 fL      Platelets 202 Thousands/uL      nRBC 0 /100 WBCs      Neutrophils Relative 66 %      Immat GRANS % 0 %      Lymphocytes Relative 14 %      Monocytes Relative 17 %      Eosinophils Relative 2 %      Basophils Relative 1 %      Neutrophils Absolute 3 67 Thousands/µL      Immature Grans Absolute 0 01 Thousand/uL      Lymphocytes Absolute 0 80 Thousands/µL      Monocytes Absolute 0 92 Thousand/µL      Eosinophils Absolute 0 13 Thousand/µL      Basophils Absolute 0 04 Thousands/µL     Urine Microscopic [[id number]]  (Abnormal) Collected:  01/19/20 1328    Lab Status:  Final result Specimen:  Urine, Clean Catch Updated:  01/19/20 1341     RBC, UA Innumerable /hpf      WBC, UA Innumerable /hpf      Epithelial Cells Occasional /hpf      Bacteria, UA Moderate /hpf     Urine culture [[id number]] Collected:  01/19/20 1328    Lab Status: In process Specimen:  Urine, Clean Catch Updated:  01/19/20 1341    Urine Macroscopic, POC [[id number]]  (Abnormal) Collected:  01/19/20 1328    Lab Status:  Final result Specimen:  Urine Updated:  01/19/20 1325     Color, UA Renetta     Clarity, UA Turbid     pH, UA 5 5     Leukocytes, UA Small     Nitrite, UA Positive     Protein,  (2+) mg/dl      Glucose, UA Negative mg/dl      Ketones, UA Negative mg/dl      Urobilinogen, UA 1 0 E U /dl      Bilirubin, UA Interference- unable to analyze     Blood, UA Large     Specific Gravity, UA >=1 030    Narrative:       CLINITEK RESULT                 No orders to display              Procedures  Procedures         ED Course                               MDM  Number of Diagnoses or Management Options  Hemorrhagic cystitis: new and requires workup  Diagnosis management comments: Generally well appearing  Afebrile and hemodynamically stable  Abdomen benign to deep palpation, no CVA tenderness  Testicular and remainder of  exam normal   Concerning for UTI with positive nitrites and large blood  Remainder of labs unremarkable with normal kidney function and no leukocytosis  Suspect UTI with hemorrhagic cystitis  Will treat with course of Keflex, 1st dose given in the ED  Discussed with the patient and family at bedside that the large blood in his urine is much more likely related to his urinary tract infection, but given his history of smoking, recommend follow-up with Urology in 2 weeks, if hematuria has not resolved he will need further workup to exclude malignancy  Return precautions discussed and patient discharged good condition  Amount and/or Complexity of Data Reviewed  Clinical lab tests: ordered and reviewed    Patient Progress  Patient progress: stable      Disposition  Final diagnoses:   Hemorrhagic cystitis     Time reflects when diagnosis was documented in both MDM as applicable and the Disposition within this note     Time User Action Codes Description Comment    1/19/2020  2:21 PM Parish Mauro Add [N30 91] Hemorrhagic cystitis       ED Disposition     ED Disposition Condition Date/Time Comment    Discharge Stable Sun Jan 19, 2020  2:21 PM Radha Weaver discharge to home/self care  Follow-up Information     Follow up With Specialties Details Why Contact Info Additional [address] Emergency Department Emergency Medicine  As we discussed, return to the Emergency Department immediately for fevers, vomiting, abdominal pain, low back pain, or inability to urinate  [address]  [phone] [facility], [address], [place], [address] [facility], [facility] [place] [facility] In 2 weeks For further evaluation of the blood in your urine   Mehdi Araiza [address] [facility], [address] [facility], [address] [facility], [address]          Discharge Medication List as of 1/19/2020  2:23 PM      START taking these medications    Details   cephalexin (KEFLEX) 500 mg capsule Take 1 capsule (500 mg total) by mouth every 6 (six) hours for 7 days, Starting Sun 1/19/2020, Until Sun 1/26/2020, Print         CONTINUE these medications which have NOT CHANGED    Details   naproxen (NAPROSYN) 500 mg tablet Take 1 tablet (500 mg total) by mouth 2 (two) times a day as needed for mild pain, Starting u 11/22/2018, Print           No discharge procedures on file      ED Provider  Electronically Signed by           Demetris Shelton MD  01/19/20 Ca Romero MD  01/19/20 9115

## 2020-01-21 LAB — BACTERIA UR CULT: ABNORMAL

## 2020-02-14 NOTE — PROGRESS NOTES
Assessment and plan:       1  Urinary tract infection  - Patient is currently asymptomatic   - Digital rectal exam today is benign   - We had a thorough discussion regarding urinary tract infections in men  Due to the patient's lower urinary tract symptoms, he is interested in at least short-term medical management  He has been given a prescription of tamsulosin and dosing and side effect profile were reviewed with the patient  - He will return in approximately 6 weeks for symptom reassessment  He may decide to undergo workup for bladder outlet obstruction if this medication works well for him  - He will have a PSA drawn prior to his follow-up  José Miguel Scott PA-C      Chief Complaint     Chief Complaint   Patient presents with    Urinary Tract Infection         History of Present Illness     Yasemin Galdamez is a 48 y o  male patient establishing care with [facility]rology after being seen in the emergency department on 01/19/2019 for gross hematuria  At that time he was diagnosed with a urinary tract infection  Culture was positive for pan susceptible E coli  No family history of prostate cancer  Patient does report that he has an intermittent stream as well as feeling of incomplete emptying on occasion  He is emptying well today with a PVR of 17 mL  Patient does endorse some erectile dysfunction  He is a current smoker and is aware that smoking can increase erectile dysfunction  Laboratory     Lab Results   Component Value Date    CREATININE 0 89 01/19/2020       No results found for: PSA    Recent Results (from the past 1 hour(s))   POCT Measure PVR    Collection Time: 02/19/20  8:44 AM   Result Value Ref Range    POST-VOID RESIDUAL VOLUME, ML POC 17 mL         Review of Systems     Review of Systems   Constitutional: Negative for chills and fever  HENT: Negative  Eyes: Negative  Respiratory: Negative for shortness of breath      Cardiovascular: Negative for chest pain  Gastrointestinal: Negative for constipation, diarrhea, nausea and vomiting  Genitourinary: Positive for urgency  Negative for difficulty urinating, dysuria, enuresis, flank pain, frequency and hematuria  Musculoskeletal: Negative  AUA SYMPTOM SCORE      Most Recent Value   AUA SYMPTOM SCORE   How often have you had a sensation of not emptying your bladder completely after you finished urinating? 1   How often have you had to urinate again less than two hours after you finished urinating? 0   How often have you found you stopped and started again several times when you urinate? 2   How often have you found it difficult to postpone urination? 1   How often have you had a weak urinary stream?  0   How often have you had to push or strain to begin urination? 0   How many times did you most typically get up to urinate from the time you went to bed at night until the time you got up in the morning?  0   Quality of Life: If you were to spend the rest of your life with your urinary condition just the way it is now, how would you feel about that?  3   AUA SYMPTOM SCORE  4                Allergies     No Known Allergies    Physical Exam     Physical Exam   Constitutional: He is oriented to person, place, and time  He appears well-developed and well-nourished  No distress  HENT:   Head: Normocephalic and atraumatic  Right Ear: External ear normal    Left Ear: External ear normal    Nose: Nose normal    Eyes: Right eye exhibits no discharge  Left eye exhibits no discharge  No scleral icterus  Cardiovascular: Normal rate  Pulmonary/Chest: Effort normal    Genitourinary:   Genitourinary Comments: Digital rectal exam reveals approximately 20 g prostate that is smooth, nontender, and without nodules  Musculoskeletal:   Ambulates independently   Neurological: He is alert and oriented to person, place, and time  Skin: Skin is warm and dry  He is not diaphoretic     Psychiatric: He has a normal mood and affect  His behavior is normal  Judgment and thought content normal    Nursing note and vitals reviewed  Vital Signs     Vitals:    02/19/20 0839   BP: 140/90   BP Location: Left arm   Patient Position: Sitting   Cuff Size: Adult   Pulse: 70   Weight: 77 1 kg (170 lb)   Height: 5' 6" (1 676 m)         Current Medications       Current Outpatient Medications:     naproxen (NAPROSYN) 500 mg tablet, Take 1 tablet (500 mg total) by mouth 2 (two) times a day as needed for mild pain (Patient not taking: Reported on 2/19/2020), Disp: 20 tablet, Rfl: 0      Active Problems     Patient Active Problem List   Diagnosis    Acute otitis externa of right ear    Diverticulitis of large intestine with abscess without bleeding         Past Medical History     Past Medical History:   Diagnosis Date    Depression     Diverticulitis of large intestine with abscess without bleeding          Surgical History     History reviewed  No pertinent surgical history        Family History     Family History   Problem Relation Age of Onset    Stroke Mother     Hypertension Father          Social History     Social History       Radiology

## 2020-02-19 ENCOUNTER — OFFICE VISIT (OUTPATIENT)
Dept: UROLOGY | Facility: CLINIC | Age: 55
End: 2020-02-19
Payer: COMMERCIAL

## 2020-02-19 VITALS
WEIGHT: 170 LBS | BODY MASS INDEX: 27.32 KG/M2 | HEIGHT: 66 IN | DIASTOLIC BLOOD PRESSURE: 90 MMHG | SYSTOLIC BLOOD PRESSURE: 140 MMHG | HEART RATE: 70 BPM

## 2020-02-19 DIAGNOSIS — N39.0 URINARY TRACT INFECTION WITHOUT HEMATURIA, SITE UNSPECIFIED: Primary | ICD-10-CM

## 2020-02-19 LAB — POST-VOID RESIDUAL VOLUME, ML POC: 17 ML

## 2020-02-19 PROCEDURE — 51798 US URINE CAPACITY MEASURE: CPT | Performed by: PHYSICIAN ASSISTANT

## 2020-02-19 PROCEDURE — 99203 OFFICE O/P NEW LOW 30 MIN: CPT | Performed by: PHYSICIAN ASSISTANT

## 2020-02-19 PROCEDURE — 3008F BODY MASS INDEX DOCD: CPT | Performed by: PHYSICIAN ASSISTANT

## 2020-02-19 RX ORDER — TAMSULOSIN HYDROCHLORIDE 0.4 MG/1
0.4 CAPSULE ORAL
Qty: 90 CAPSULE | Refills: 3 | Status: SHIPPED | OUTPATIENT
Start: 2020-02-19 | End: 2020-10-06

## 2020-03-23 ENCOUNTER — TELEPHONE (OUTPATIENT)
Dept: UROLOGY | Facility: MEDICAL CENTER | Age: 55
End: 2020-03-23

## 2020-03-23 NOTE — TELEPHONE ENCOUNTER
Reschedule for 6-8 weeks from now please  If patient is not doing well and would like to be "seen" we can do a televisit to get us through until he can be seen

## 2020-03-23 NOTE — TELEPHONE ENCOUNTER
Pt managed by Tawana Pike,pt's wife Kim Isaac called questioning 04/01/20 appointment due to COVID-19 please review and contact her

## 2020-05-27 ENCOUNTER — APPOINTMENT (OUTPATIENT)
Dept: LAB | Facility: CLINIC | Age: 55
End: 2020-05-27
Payer: COMMERCIAL

## 2020-05-27 DIAGNOSIS — N39.0 URINARY TRACT INFECTION WITHOUT HEMATURIA, SITE UNSPECIFIED: ICD-10-CM

## 2020-05-27 LAB — PSA SERPL-MCNC: 0.5 NG/ML (ref 0–4)

## 2020-05-27 PROCEDURE — G0103 PSA SCREENING: HCPCS

## 2020-05-27 PROCEDURE — 36415 COLL VENOUS BLD VENIPUNCTURE: CPT

## 2020-06-01 ENCOUNTER — TELEPHONE (OUTPATIENT)
Dept: UROLOGY | Facility: MEDICAL CENTER | Age: 55
End: 2020-06-01

## 2020-06-03 NOTE — TELEPHONE ENCOUNTER
Patients wife called and said  has an appointment tomorrow with Joan Tan in [place]  Patient is not feeling well and I changed appointment to a telephone appointment  The wife said they wanted to still be able to talk to Joan Tan  If there are any questions she can be reached at [phone]

## 2020-06-04 ENCOUNTER — TELEMEDICINE (OUTPATIENT)
Dept: FAMILY MEDICINE CLINIC | Facility: CLINIC | Age: 55
End: 2020-06-04
Payer: COMMERCIAL

## 2020-06-04 ENCOUNTER — TELEMEDICINE (OUTPATIENT)
Dept: UROLOGY | Facility: CLINIC | Age: 55
End: 2020-06-04
Payer: COMMERCIAL

## 2020-06-04 VITALS — HEIGHT: 66 IN | TEMPERATURE: 96.8 F | BODY MASS INDEX: 27.44 KG/M2

## 2020-06-04 DIAGNOSIS — N39.0 URINARY TRACT INFECTION WITHOUT HEMATURIA, SITE UNSPECIFIED: Primary | ICD-10-CM

## 2020-06-04 DIAGNOSIS — Z20.828 EXPOSURE TO SARS-ASSOCIATED CORONAVIRUS: ICD-10-CM

## 2020-06-04 DIAGNOSIS — R53.83 FATIGUE, UNSPECIFIED TYPE: Primary | ICD-10-CM

## 2020-06-04 PROCEDURE — 99214 OFFICE O/P EST MOD 30 MIN: CPT | Performed by: FAMILY MEDICINE

## 2020-06-04 PROCEDURE — 99213 OFFICE O/P EST LOW 20 MIN: CPT | Performed by: PHYSICIAN ASSISTANT

## 2020-06-05 ENCOUNTER — APPOINTMENT (OUTPATIENT)
Dept: LAB | Facility: CLINIC | Age: 55
End: 2020-06-05
Payer: COMMERCIAL

## 2020-06-05 DIAGNOSIS — R53.83 FATIGUE, UNSPECIFIED TYPE: ICD-10-CM

## 2020-06-05 LAB
ALBUMIN SERPL BCP-MCNC: 3.8 G/DL (ref 3.5–5)
ALP SERPL-CCNC: 74 U/L (ref 46–116)
ALT SERPL W P-5'-P-CCNC: 64 U/L (ref 12–78)
ANION GAP SERPL CALCULATED.3IONS-SCNC: 7 MMOL/L (ref 4–13)
AST SERPL W P-5'-P-CCNC: 26 U/L (ref 5–45)
BASOPHILS # BLD AUTO: 0.09 THOUSANDS/ΜL (ref 0–0.1)
BASOPHILS NFR BLD AUTO: 1 % (ref 0–1)
BILIRUB SERPL-MCNC: 0.54 MG/DL (ref 0.2–1)
BUN SERPL-MCNC: 14 MG/DL (ref 5–25)
CALCIUM SERPL-MCNC: 9.1 MG/DL (ref 8.3–10.1)
CHLORIDE SERPL-SCNC: 104 MMOL/L (ref 100–108)
CO2 SERPL-SCNC: 27 MMOL/L (ref 21–32)
CREAT SERPL-MCNC: 0.82 MG/DL (ref 0.6–1.3)
EOSINOPHIL # BLD AUTO: 0.29 THOUSAND/ΜL (ref 0–0.61)
EOSINOPHIL NFR BLD AUTO: 3 % (ref 0–6)
ERYTHROCYTE [DISTWIDTH] IN BLOOD BY AUTOMATED COUNT: 12.9 % (ref 11.6–15.1)
GFR SERPL CREATININE-BSD FRML MDRD: 102 ML/MIN/1.73SQ M
GLUCOSE P FAST SERPL-MCNC: 102 MG/DL (ref 65–99)
HCT VFR BLD AUTO: 49.5 % (ref 36.5–49.3)
HGB BLD-MCNC: 16.2 G/DL (ref 12–17)
IMM GRANULOCYTES # BLD AUTO: 0.03 THOUSAND/UL (ref 0–0.2)
IMM GRANULOCYTES NFR BLD AUTO: 0 % (ref 0–2)
LYMPHOCYTES # BLD AUTO: 1.96 THOUSANDS/ΜL (ref 0.6–4.47)
LYMPHOCYTES NFR BLD AUTO: 22 % (ref 14–44)
MCH RBC QN AUTO: 29.8 PG (ref 26.8–34.3)
MCHC RBC AUTO-ENTMCNC: 32.7 G/DL (ref 31.4–37.4)
MCV RBC AUTO: 91 FL (ref 82–98)
MONOCYTES # BLD AUTO: 0.81 THOUSAND/ΜL (ref 0.17–1.22)
MONOCYTES NFR BLD AUTO: 9 % (ref 4–12)
NEUTROPHILS # BLD AUTO: 5.77 THOUSANDS/ΜL (ref 1.85–7.62)
NEUTS SEG NFR BLD AUTO: 65 % (ref 43–75)
NRBC BLD AUTO-RTO: 0 /100 WBCS
PLATELET # BLD AUTO: 171 THOUSANDS/UL (ref 149–390)
PMV BLD AUTO: 11.6 FL (ref 8.9–12.7)
POTASSIUM SERPL-SCNC: 4 MMOL/L (ref 3.5–5.3)
PROT SERPL-MCNC: 6.8 G/DL (ref 6.4–8.2)
RBC # BLD AUTO: 5.43 MILLION/UL (ref 3.88–5.62)
SODIUM SERPL-SCNC: 138 MMOL/L (ref 136–145)
WBC # BLD AUTO: 8.95 THOUSAND/UL (ref 4.31–10.16)

## 2020-06-05 PROCEDURE — 80053 COMPREHEN METABOLIC PANEL: CPT

## 2020-06-05 PROCEDURE — 36415 COLL VENOUS BLD VENIPUNCTURE: CPT

## 2020-06-05 PROCEDURE — 85025 COMPLETE CBC W/AUTO DIFF WBC: CPT

## 2020-08-03 PROBLEM — N40.1 ENLARGED PROSTATE WITH LOWER URINARY TRACT SYMPTOMS (LUTS): Status: ACTIVE | Noted: 2020-08-03

## 2020-08-03 NOTE — PATIENT INSTRUCTIONS
Decision Aid for Benign Prostatic Hyperplasia   WHAT YOU NEED TO KNOW:   What do I need to know about decisions for benign prostatic hyperplasia (BPH)? You can work with your healthcare provider to make decisions about being screened or treated for BPH  Screening is a test done to find BPH early  Screening is different from diagnosis because screening is used before you first start to have signs or symptoms  This means management or treatment can start early  You can also help plan treatment if BPH is found with screening, or you develop it later on  Your treatment choices include nonsurgical options and surgery  Your healthcare provider may recommend nonsurgical treatments first  Learn about the benefits and risks of nonsurgical treatment and surgery so you can make an informed choice  What do I need to know about BPH?   · BPH is an enlarged prostate  The prostate is a small gland that is part of the reproductive system  It sits around your urethra (tube that carries urine out of your bladder)  The prostate is usually about the size of a walnut  An enlarged prostate will press on the urethra  This can cause problems with storing urine or emptying your bladder completely  · BPH is common in men older than 40 years  The risk increases with age  · Benign means it is not cancer  BPH is not a life-threatening condition, but it can cause problems with your daily activities  BPH usually gets worse over time  Left untreated, BPH can also lead to blood in your urine, bladder stones, or kidney failure  · Talk to your healthcare provider if you think you have signs or symptoms of BPH  Examples include problems starting your urine to flow, or an urgent need to urinate  You may be woken from sleep by the need to urinate  Am I a good candidate for BPH screening? Screening may be helpful for you if any of the following is true:  · You are 40 years or older      · You have a family history of BPH or other prostate problems  · You have symptoms of BPH that cause problems with your daily activities or quality of life  · You want to have treatment as early as possible if needed  · You have heart disease or take a beta-blocker medicine  How is screening done? · The International Prostate Symptom Score  is a set of questions about your ability to urinate over the past month  You will be asked how often you have any of the following:     ¨ A feeling of not fully emptying your bladder when you urinate    ¨ A need to urinate again within 2 hours after you last urinated    ¨ Urine that stops and starts several times when you urinate    ¨ An urgent need to urinate that you could not put off    ¨ A weak urine stream    ¨ Trouble starting your urine flow, or a need to push or strain to get it to start    ¨ Being woken from sleep because you needed to urinate    · A digital rectal exam  is used to check the size of your prostate  Your healthcare provider will insert a gloved finger into your rectum  The provider will be able to feel your prostate  The exam may be repeated over time to check the prostate size  · A PSA test  is used to measure the amount of a protein made by your prostate gland  A blood sample is taken for this test  A high PSA level can increase your risk for more severe urination problems or the need for surgery  What are the benefits and risks of screening? Talk with your healthcare provider about the risks and benefits of screening:  · Benefits  include finding BPH early  This means you can make more decisions about treatments  The PSA test can also find prostate cancer early  Treatment of prostate cancer is more successful when it starts early  · Risks  include a false belief that you will not develop BPH if your screening result is negative  Even though your screening result is negative, you may still develop it later on   You may also need more tests if you have problems urinating but screening shows you do not have BPH  What questions should I ask my healthcare provider to help me make decisions about screening? · How high is my risk for BPH? · How often do I need to have screening? · Where is the screening done? · Do I need to do anything to get ready to have screening? What happens after I have screening? You will meet with your healthcare provider to go over the results of your screening  You may need more tests to diagnose anything that showed up on the screening test  Common tests include a urine test to check for an infection or a biopsy (tissue sample) to check for cancer  You may also need tests to measure the amount of urine left in your bladder after you urinate  The force of your urine flow may also be measured  You and your healthcare provider can talk about your treatment options  Together you can decide which treatment is right for you  How is BPH treated, and what are the benefits of treatment? · Watchful waiting  means you do not receive treatment right away  Your signs and symptoms will be monitored over time to see if they get worse  Your healthcare provider may recommend ways to improve or track your symptoms during watchful waiting  Examples include drinking less liquid or urinating on a regular schedule  Your provider may also recommend lifestyle changes  For example, your symptoms may improve if you lose weight or have less caffeine if needed  You may be asked to keep a record of your symptoms and when you urinate  The record will include when you urinate, how easy or difficult it was, and any changes in urination  You will bring the record to follow-up visits to help you and your provider decide on treatment you may want to try  · Medicines  may be given to help your symptoms and to prevent BPH from getting worse  Medicines may help relax certain muscles to make it easier for you to urinate   You may also need medicine to make your prostate smaller or to relieve an overactive bladder  Medicines may start to relieve your symptoms quickly  Medicines can improve your quality of life  You may also be able to manage your symptoms without surgery if medicine keeps your BPH from getting worse  · Surgery  may be used to relieve your symptoms if other treatments do not work  An ablation is surgery that uses a needle to destroy extra tissue that is causing your symptoms  A laser may instead be used to destroy the tissue  Your prostate may be heated  A tool that gives off heat is inserted into your urethra  Prostate tissue is destroyed, and no other tissues are damaged  Parts of your prostate may be removed during another type of surgery  What are the risks of BPH treatment? · Watchful waiting  may allow BPH to get worse and be more difficult to treat than at an early stage  If you have a high PSA level, you may need to have BPH treated right away  · Medicines  can cause certain side effects, such as erectile dysfunction (ED) or a lowered sex drive  You may also develop urinary retention (trouble starting your urine to flow)  Some medicines can cause hypotension (low blood pressure) when you stand, or dizziness  · Surgery  can damage tissue around your prostate  Surgery can also increase your risk for trouble urinating, incontinence (leaking), or urinary retention  You may bleed more than expected during surgery or develop an infection  You may also need to be treated again if the surgery you have does not relieve your symptoms  What questions should I ask my provider to help me make decisions about treatment? · How will I feel if I continue to have these symptoms for the rest of my life? · Which medicines may work best to treat my symptoms? · What other steps can I take to decrease my symptoms? · Will I have to take medicine for the rest of my life? · What side effects might happen with each medicine I can try?     · Am I a good candidate for surgery? · Which surgery may work best to treat my symptoms? · Where is the surgery done? · How long is recovery after surgery? · What are the possible side effects of surgery? CARE AGREEMENT:   You have the right to help plan your care  Learn about your health condition and how it may be treated  Discuss treatment options with your caregivers to decide what care you want to receive  You always have the right to refuse treatment  The above information is an  only  It is not intended as medical advice for individual conditions or treatments  Talk to your doctor, nurse or pharmacist before following any medical regimen to see if it is safe and effective for you  © 2017 [facility] Information is for End User's use only and may not be sold, redistributed or otherwise used for commercial purposes  All illustrations and images included in CareNotes® are the copyrighted property of [employer]  or [employer]

## 2020-08-03 NOTE — PROGRESS NOTES
Problem List Items Addressed This Visit        Genitourinary    Enlarged prostate with lower urinary tract symptoms (LUTS) - Primary    Relevant Orders    POCT urine dip (Completed)    Case request operating room: TRANSURETHRAL RESECTION OF BLADDER TUMOR (TURBT), CYSTOSCOPY RETROGRADE PYELOGRAM WITH INSERTION STENT URETERAL, TRANSURETHRAL RESECTION OF PROSTATE W/ LASER/Transurethral incision of prostate, button electrode (Completed)      Other Visit Diagnoses     Bladder tumor        Relevant Orders    Case request operating room: TRANSURETHRAL RESECTION OF BLADDER TUMOR (TURBT), CYSTOSCOPY RETROGRADE PYELOGRAM WITH INSERTION STENT URETERAL, TRANSURETHRAL RESECTION OF PROSTATE W/ LASER/Transurethral incision of prostate, button electrode (Completed)                Discussion:    I had a nice visit with Rashmi Tomas today  We diagnosed a small, low grade appearing bladder tumor at the right bladder neck  We also diagnosed a tight bladder neck and a prostate measuring 29 87 g  He has had some improvement with medical therapies but does not want to be on medicines forever  Given his cystoscopy findings today he does require TURBT and bilateral retrograde pyelography as well as concomitant transurethral incision of prostate  He does not have a large intraurethral component of compression from lateral lobe compression, he has more of a high and tight bladder neck  I did  him on the entity of retrograde ejaculation, and that he should expect this after his procedure  He is okay with this  Also perform smoking cessation counseling with him today, he has smoked between 1-2 packs of cigarettes per day for quite some time  He has previously stop smoking before, and plans to do so again, now that he has been diagnosed with likely bladder cancer      Denies history of easy bruising and bleeding, he has a good performance status and is quite physically active, his relatively high intake of cigarettes, not with standing  He will sign informed consent on the day of his procedure, he does understand that he will require a Henry catheter for number of days after this procedure as well  All questions and concerns answered and addressed today  I did try to call both of the listed numbers for his wife, at his request, to discuss his care and his findings today, unfortunately she did not answer either of these numbers  I did leave a voicemail without leaving protected health information to let her know that I did try to call her to discuss these findings        Assessment and plan:       Please see problem oriented charting for the assessment plan of today's urological complaints    Yolie Kim MD      Chief Complaint     Chief Complaint   Patient presents with    Cystoscopy     BPH         History of Present Illness     Nasir Aguilar is a 48 y o  gentleman with a history of urinary tract infection, he also has a history of intermittent stream and incomplete emptying  He has been interested in surgical therapies for his lower urinary tract symptoms given his young age and wish to avoid lifelong medical therapy  In terms of his smoking he states that he smokes  between 1-2 packs of cigarettes per day  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[employer], rides motorcycles for fun  Counseled him on Uro lift, TURP, and his other options for management and he wishes to proceed to TURBT, with concomitant transurethral incision of prostate  The following portions of the patient's history were reviewed and updated as appropriate: allergies, current medications, past family history, past medical history, past social history, past surgical history and problem list     Detailed Urologic History     - please refer to HPI    Review of Systems     Review of Systems   Constitutional: Negative  HENT: Negative  Eyes: Negative  Respiratory: Negative  Cardiovascular: Negative  Gastrointestinal: Negative      Endocrine: Negative  Genitourinary: Positive for difficulty urinating, frequency and urgency  Also with intermittent stream   Musculoskeletal: Negative  Skin: Negative  Allergic/Immunologic: Negative  Neurological: Negative  Hematological: Negative  Psychiatric/Behavioral: Negative  Allergies     No Known Allergies    Physical Exam     Physical Exam  Vitals signs and nursing note reviewed  Constitutional:       General: He is not in acute distress  Appearance: Normal appearance  He is well-developed and normal weight  He is not ill-appearing, toxic-appearing or diaphoretic  Comments: Stigmata of tobacco abuse disorder are present   HENT:      Head: Normocephalic and atraumatic  Eyes:      General:         Right eye: No discharge  Left eye: No discharge  Neck:      Musculoskeletal: Normal range of motion and neck supple  Thyroid: No thyromegaly  Trachea: No tracheal deviation  Cardiovascular:      Rate and Rhythm: Normal rate  Pulses: Normal pulses  Pulmonary:      Effort: Pulmonary effort is normal  No respiratory distress  Breath sounds: Normal breath sounds  No stridor  Abdominal:      General: There is no distension  Palpations: Abdomen is soft  There is no mass  Tenderness: There is no abdominal tenderness  There is no guarding or rebound  Hernia: No hernia is present  Genitourinary:     Comments: Normal phallus, apart from chuck meatus, normal sphincter tone  Musculoskeletal: Normal range of motion  General: No swelling, tenderness, deformity or signs of injury  Lymphadenopathy:      Cervical: No cervical adenopathy  Skin:     General: Skin is warm and dry  Coloration: Skin is not pale  Findings: No erythema or rash  Neurological:      General: No focal deficit present  Mental Status: He is alert and oriented to person, place, and time  Cranial Nerves: No cranial nerve deficit  Sensory: No sensory deficit  Motor: No weakness or abnormal muscle tone  Coordination: Coordination normal    Psychiatric:         Behavior: Behavior normal          Thought Content: Thought content normal          Judgment: Judgment normal              Vital Signs  Vitals:    08/05/20 1306   BP: 120/72   BP Location: Left arm   Patient Position: Sitting   Cuff Size: Adult   Pulse: 75   Temp: (!) 97 2 °F (36 2 °C)   Weight: 80 7 kg (178 lb)   Height: 5' 6" (1 676 m)         Current Medications       Current Outpatient Medications:     tamsulosin (FLOMAX) 0 4 mg, Take 1 capsule (0 4 mg total) by mouth daily with dinner, Disp: 90 capsule, Rfl: 3      Active Problems     Patient Active Problem List   Diagnosis    Acute otitis externa of right ear    Diverticulitis of large intestine with abscess without bleeding    Fatigue    Enlarged prostate with lower urinary tract symptoms (LUTS)         Past Medical History     Past Medical History:   Diagnosis Date    Depression     Diverticulitis of large intestine with abscess without bleeding          Surgical History     History reviewed  No pertinent surgical history  Family History     Family History   Problem Relation Age of Onset    Stroke Mother     Hypertension Father          Social History     Social History     Social History     Tobacco Use   Smoking Status Current Every Day Smoker    Packs/day: 2 00    Types: Cigarettes   Smokeless Tobacco Never Used   smokes about a pack a day, [employer] cigarettes         Pertinent Lab Values     Lab Results   Component Value Date    CREATININE 0 82 06/05/2020       Lab Results   Component Value Date    PSA 0 5 05/27/2020             Pertinent Imaging      There is no pertinent urological imaging for my review

## 2020-08-05 ENCOUNTER — PROCEDURE VISIT (OUTPATIENT)
Dept: UROLOGY | Facility: CLINIC | Age: 55
End: 2020-08-05
Payer: COMMERCIAL

## 2020-08-05 VITALS
SYSTOLIC BLOOD PRESSURE: 120 MMHG | HEART RATE: 75 BPM | BODY MASS INDEX: 28.61 KG/M2 | HEIGHT: 66 IN | TEMPERATURE: 97.2 F | WEIGHT: 178 LBS | DIASTOLIC BLOOD PRESSURE: 72 MMHG

## 2020-08-05 DIAGNOSIS — N40.1 BENIGN PROSTATIC HYPERPLASIA WITH URINARY HESITANCY: Primary | ICD-10-CM

## 2020-08-05 DIAGNOSIS — R39.11 BENIGN PROSTATIC HYPERPLASIA WITH URINARY HESITANCY: Primary | ICD-10-CM

## 2020-08-05 DIAGNOSIS — D49.4 BLADDER TUMOR: ICD-10-CM

## 2020-08-05 LAB
SL AMB  POCT GLUCOSE, UA: NORMAL
SL AMB LEUKOCYTE ESTERASE,UA: NORMAL
SL AMB POCT BILIRUBIN,UA: NORMAL
SL AMB POCT BLOOD,UA: NORMAL
SL AMB POCT CLARITY,UA: CLEAR
SL AMB POCT COLOR,UA: YELLOW
SL AMB POCT KETONES,UA: NORMAL
SL AMB POCT NITRITE,UA: NORMAL
SL AMB POCT PH,UA: 7
SL AMB POCT SPECIFIC GRAVITY,UA: 1
SL AMB POCT URINE PROTEIN: NORMAL
SL AMB POCT UROBILINOGEN: 0.2

## 2020-08-05 PROCEDURE — 76872 US TRANSRECTAL: CPT | Performed by: UROLOGY

## 2020-08-05 PROCEDURE — 3008F BODY MASS INDEX DOCD: CPT | Performed by: UROLOGY

## 2020-08-05 PROCEDURE — 52000 CYSTOURETHROSCOPY: CPT | Performed by: UROLOGY

## 2020-08-05 PROCEDURE — 81002 URINALYSIS NONAUTO W/O SCOPE: CPT | Performed by: UROLOGY

## 2020-08-05 PROCEDURE — 99214 OFFICE O/P EST MOD 30 MIN: CPT | Performed by: UROLOGY

## 2020-08-05 RX ORDER — ACETAMINOPHEN 325 MG/1
975 TABLET ORAL ONCE
Status: CANCELLED | OUTPATIENT
Start: 2020-08-05 | End: 2020-08-05

## 2020-08-05 RX ORDER — GABAPENTIN 100 MG/1
300 CAPSULE ORAL ONCE
Status: CANCELLED | OUTPATIENT
Start: 2020-08-05 | End: 2020-08-05

## 2020-08-05 NOTE — PROGRESS NOTES
Office TRUS    Indication    Medically refractory lower urinary tract symptoms    Informed consent   The risks, benefits and alternatives to TRUS discussed with patient, informed consent obtained      Transrectal ultrasonography  The patient was placed in the left lateral decubitus position  After an attentive digital rectal examination, a 7 5 mHz sidefire ultrasound probe was gently inserted into the rectum and biplanar imaging of the prostate was done with the findings noted below  Images were taken of any abnormal findings and also to document prostate size  Bladder  The bladder base appeared normal     Prostate      Ultrasound size measurements:  -Volume:  29 87 cm3    Ultrasound findings:  -Cysts: None  -Masses: None  -Median lobe: absent                  Complications  There were no procedural complications  Disposition  The patient was dismissed to home   Post-procedure instructions:      Today he underwent an uncomplicated transrectal ultrasound showing him to be a good candidate for TUIP

## 2020-08-05 NOTE — LETTER
August 5, 2020     Lizet Guidry MD  [id number] [facility],[address]  [place] [address]    Patient: Tracey Schreiber   YOB: 1965   Date of Visit: 8/5/2020       Dear Dr Kaiser Villagomez:    Thank you for referring Tracey Schreiber to me for evaluation  Below are my notes for this consultation  If you have questions, please do not hesitate to call me  I look forward to following your patient along with you  Sincerely,        Nae Zuniga MD        CC: No Recipients  Nae Zuniga MD  8/5/2020  2:03 PM  Sign when Signing Visit  Office TRUS    Indication    Medically refractory lower urinary tract symptoms    Informed consent   The risks, benefits and alternatives to TRUS discussed with patient, informed consent obtained      Transrectal ultrasonography  The patient was placed in the left lateral decubitus position  After an attentive digital rectal examination, a 7 5 mHz sidefire ultrasound probe was gently inserted into the rectum and biplanar imaging of the prostate was done with the findings noted below  Images were taken of any abnormal findings and also to document prostate size  Bladder  The bladder base appeared normal     Prostate      Ultrasound size measurements:  -Volume:  29 87 cm3    Ultrasound findings:  -Cysts: None  -Masses: None  -Median lobe: absent                  Complications  There were no procedural complications  Disposition  The patient was dismissed to home   Post-procedure instructions: Today he underwent an uncomplicated transrectal ultrasound showing him to be a good candidate for ANILA Zuniga MD  8/5/2020  2:01 PM  Sign when Signing Visit  Office Cystoscopy Procedure Note    Indication:     medically refractory lower urinary tract symptoms     Informed consent   The risks, benefits, complications, treatment options, and expected outcomes were discussed with the patient   The patient concurred with the proposed plan and provided informed consent  Anesthesia  Lidocaine jelly 2%    Antibiotic prophylaxis   None    Procedure  The patient was placed in the supineposition, was prepped and draped in the usual manner using sterile technique, and 2% lidocaine jelly instilled into the urethra  A 17 F flexible cystoscope was then inserted into the urethra and the urethra and bladder carefully examined  The following findings were noted:    Findings:  Urethra:  Patient with a chuck meatus, no stricture disease, intact sphincter mechanism  Prostate:  High and tight bladder neck, mild lateral lobe hypertrophy, based on the aggravated of his gland I suspect that he would do poorly with Uro lift, would do better with a transurethral incision of the prostate  Bladder: The bladder tumor is noted on a retroflexed view at the bladder neck on the right-hand side, this appears to be low-grade, 1 centimeter in size  Ureteral orifices:  Orthotopic, clear urine exiting  Other findings:  None, retroflexed view confirms    Specimens: None                 Complications:    None; patient tolerated the procedure well           Disposition: To home           Condition: Stable    Plan: High and tight bladder neck, likely causing the patient's symptoms, new diagnosis of a roughly 1 centimeter bladder tumor at the right side of the bladder neck, this will require TURBT  Cystoscopy    Date/Time: 8/5/2020 2:01 PM  Performed by: Dustin Kenney MD  Authorized by: Dustin Kenney MD     Procedure details: cystoscopy    Patient tolerance: Patient tolerated the procedure well with no immediate complications    Additional Procedure Details: Office Cystoscopy Procedure Note    Indication:     medically refractory lower urinary tract symptoms     Informed consent   The risks, benefits, complications, treatment options, and expected outcomes were discussed with the patient  The patient concurred with the proposed plan and provided informed consent      Anesthesia  Lidocaine jelly 2%    Antibiotic prophylaxis   None    Procedure  The patient was placed in the supineposition, was prepped and draped in the usual manner using sterile technique, and 2% lidocaine jelly instilled into the urethra  A 17 F flexible cystoscope was then inserted into the urethra and the urethra and bladder carefully examined  The following findings were noted:    Findings:  Urethra:  Patient with a chuck meatus, no stricture disease, intact sphincter mechanism  Prostate:  High and tight bladder neck, mild lateral lobe hypertrophy, based on the aggravated of his gland I suspect that he would do poorly with Uro lift, would do better with a transurethral incision of the prostate  Bladder:   The bladder tumor is noted on a retroflexed view at the bladder neck on the right-hand side, this appears to be low-grade, 1 centimeter in size  Ureteral orifices:  Orthotopic, clear urine exiting  Other findings:  None, retroflexed view confirms    Specimens: None                 Complications:    None; patient tolerated the procedure well           Disposition: To home           Condition: Stable    Plan: High and tight bladder neck, likely causing the patient's symptoms, new diagnosis of a roughly 1 centimeter bladder tumor at the right side of the bladder neck, this will require TURBT       ]          Crys Cruz MD  8/5/2020  1:59 PM  Sign when Signing Visit       Problem List Items Addressed This Visit        Genitourinary    Enlarged prostate with lower urinary tract symptoms (LUTS) - Primary    Relevant Orders    POCT urine dip (Completed)    Case request operating room: TRANSURETHRAL RESECTION OF BLADDER TUMOR (TURBT), CYSTOSCOPY RETROGRADE PYELOGRAM WITH INSERTION STENT URETERAL, TRANSURETHRAL RESECTION OF PROSTATE W/ LASER/Transurethral incision of prostate, button electrode (Completed)      Other Visit Diagnoses     Bladder tumor        Relevant Orders    Case request operating room: TRANSURETHRAL RESECTION OF BLADDER TUMOR (TURBT), CYSTOSCOPY RETROGRADE PYELOGRAM WITH INSERTION STENT URETERAL, TRANSURETHRAL RESECTION OF PROSTATE W/ LASER/Transurethral incision of prostate, button electrode (Completed)                Discussion:    I had a nice visit with Hali today  We diagnosed a small, low grade appearing bladder tumor at the right bladder neck  We also diagnosed a tight bladder neck and a prostate measuring 29 87 g  He has had some improvement with medical therapies but does not want to be on medicines forever  Given his cystoscopy findings today he does require TURBT and bilateral retrograde pyelography as well as concomitant transurethral incision of prostate  He does not have a large intraurethral component of compression from lateral lobe compression, he has more of a high and tight bladder neck  I did  him on the entity of retrograde ejaculation, and that he should expect this after his procedure  He is okay with this  Also perform smoking cessation counseling with him today, he has smoked between 1-2 packs of cigarettes per day for quite some time  He has previously stop smoking before, and plans to do so again, now that he has been diagnosed with likely bladder cancer  Denies history of easy bruising and bleeding, he has a good performance status and is quite physically active, his relatively high intake of cigarettes, not with standing  He will sign informed consent on the day of his procedure, he does understand that he will require a Henry catheter for number of days after this procedure as well  All questions and concerns answered and addressed today  I did try to call both of the listed numbers for his wife, at his request, to discuss his care and his findings today, unfortunately she did not answer either of these numbers    I did leave a voicemail without leaving protected health information to let her know that I did try to call her to discuss these findings        Assessment and plan: Please see problem oriented charting for the assessment plan of today's urological complaints    Cristela Martinez MD      Chief Complaint     Chief Complaint   Patient presents with    Cystoscopy     BPH         History of Present Illness     Coleman Kyle is a 48 y o  gentleman with a history of urinary tract infection, he also has a history of intermittent stream and incomplete emptying  He has been interested in surgical therapies for his lower urinary tract symptoms given his young age and wish to avoid lifelong medical therapy  In terms of his smoking he states that he smokes  between 1-2 packs of cigarettes per day  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[employer], rides motorcycles for fun  Counseled him on Uro lift, TURP, and his other options for management and he wishes to proceed to TURBT, with concomitant transurethral incision of prostate  The following portions of the patient's history were reviewed and updated as appropriate: allergies, current medications, past family history, past medical history, past social history, past surgical history and problem list     Detailed Urologic History     - please refer to HPI    Review of Systems     Review of Systems   Constitutional: Negative  HENT: Negative  Eyes: Negative  Respiratory: Negative  Cardiovascular: Negative  Gastrointestinal: Negative  Endocrine: Negative  Genitourinary: Positive for difficulty urinating, frequency and urgency  Also with intermittent stream   Musculoskeletal: Negative  Skin: Negative  Allergic/Immunologic: Negative  Neurological: Negative  Hematological: Negative  Psychiatric/Behavioral: Negative  Allergies     No Known Allergies    Physical Exam     Physical Exam  Vitals signs and nursing note reviewed  Constitutional:       General: He is not in acute distress  Appearance: Normal appearance  He is well-developed and normal weight   He is not ill-appearing, toxic-appearing or diaphoretic  Comments: Stigmata of tobacco abuse disorder are present   HENT:      Head: Normocephalic and atraumatic  Eyes:      General:         Right eye: No discharge  Left eye: No discharge  Neck:      Musculoskeletal: Normal range of motion and neck supple  Thyroid: No thyromegaly  Trachea: No tracheal deviation  Cardiovascular:      Rate and Rhythm: Normal rate  Pulses: Normal pulses  Pulmonary:      Effort: Pulmonary effort is normal  No respiratory distress  Breath sounds: Normal breath sounds  No stridor  Abdominal:      General: There is no distension  Palpations: Abdomen is soft  There is no mass  Tenderness: There is no abdominal tenderness  There is no guarding or rebound  Hernia: No hernia is present  Genitourinary:     Comments: Normal phallus, apart from chuck meatus, normal sphincter tone  Musculoskeletal: Normal range of motion  General: No swelling, tenderness, deformity or signs of injury  Lymphadenopathy:      Cervical: No cervical adenopathy  Skin:     General: Skin is warm and dry  Coloration: Skin is not pale  Findings: No erythema or rash  Neurological:      General: No focal deficit present  Mental Status: He is alert and oriented to person, place, and time  Cranial Nerves: No cranial nerve deficit  Sensory: No sensory deficit  Motor: No weakness or abnormal muscle tone  Coordination: Coordination normal    Psychiatric:         Behavior: Behavior normal          Thought Content:  Thought content normal          Judgment: Judgment normal              Vital Signs  Vitals:    08/05/20 1306   BP: 120/72   BP Location: Left arm   Patient Position: Sitting   Cuff Size: Adult   Pulse: 75   Temp: (!) 97 2 °F (36 2 °C)   Weight: 80 7 kg (178 lb)   Height: 5' 6" (1 676 m)         Current Medications       Current Outpatient Medications:     tamsulosin (FLOMAX) 0 4 mg, Take 1 capsule (0 4 mg total) by mouth daily with dinner, Disp: 90 capsule, Rfl: 3      Active Problems     Patient Active Problem List   Diagnosis    Acute otitis externa of right ear    Diverticulitis of large intestine with abscess without bleeding    Fatigue    Enlarged prostate with lower urinary tract symptoms (LUTS)         Past Medical History     Past Medical History:   Diagnosis Date    Depression     Diverticulitis of large intestine with abscess without bleeding          Surgical History     History reviewed  No pertinent surgical history  Family History     Family History   Problem Relation Age of Onset    Stroke Mother     Hypertension Father          Social History     Social History     Social History     Tobacco Use   Smoking Status Current Every Day Smoker    Packs/day: 2 00    Types: Cigarettes   Smokeless Tobacco Never Used   smokes about a pack a day, [employer] cigarettes         Pertinent Lab Values     Lab Results   Component Value Date    CREATININE 0 82 06/05/2020       Lab Results   Component Value Date    PSA 0 5 05/27/2020             Pertinent Imaging      There is no pertinent urological imaging for my review

## 2020-08-05 NOTE — PROGRESS NOTES
Office Cystoscopy Procedure Note    Indication:     medically refractory lower urinary tract symptoms     Informed consent   The risks, benefits, complications, treatment options, and expected outcomes were discussed with the patient  The patient concurred with the proposed plan and provided informed consent  Anesthesia  Lidocaine jelly 2%    Antibiotic prophylaxis   None    Procedure  The patient was placed in the supineposition, was prepped and draped in the usual manner using sterile technique, and 2% lidocaine jelly instilled into the urethra  A 17 F flexible cystoscope was then inserted into the urethra and the urethra and bladder carefully examined  The following findings were noted:    Findings:  Urethra:  Patient with a chuck meatus, no stricture disease, intact sphincter mechanism  Prostate:  High and tight bladder neck, mild lateral lobe hypertrophy, based on the aggravated of his gland I suspect that he would do poorly with Uro lift, would do better with a transurethral incision of the prostate  Bladder: The bladder tumor is noted on a retroflexed view at the bladder neck on the right-hand side, this appears to be low-grade, 1 centimeter in size  Ureteral orifices:  Orthotopic, clear urine exiting  Other findings:  None, retroflexed view confirms    Specimens: None                 Complications:    None; patient tolerated the procedure well           Disposition: To home           Condition: Stable    Plan: High and tight bladder neck, likely causing the patient's symptoms, new diagnosis of a roughly 1 centimeter bladder tumor at the right side of the bladder neck, this will require TURBT      Cystoscopy    Date/Time: 8/5/2020 2:01 PM  Performed by: Deann Walters MD  Authorized by: Deann Walters MD     Procedure details: cystoscopy    Patient tolerance: Patient tolerated the procedure well with no immediate complications    Additional Procedure Details: Office Cystoscopy Procedure Note    Indication:     medically refractory lower urinary tract symptoms     Informed consent   The risks, benefits, complications, treatment options, and expected outcomes were discussed with the patient  The patient concurred with the proposed plan and provided informed consent  Anesthesia  Lidocaine jelly 2%    Antibiotic prophylaxis   None    Procedure  The patient was placed in the supineposition, was prepped and draped in the usual manner using sterile technique, and 2% lidocaine jelly instilled into the urethra  A 17 F flexible cystoscope was then inserted into the urethra and the urethra and bladder carefully examined  The following findings were noted:    Findings:  Urethra:  Patient with a chuck meatus, no stricture disease, intact sphincter mechanism  Prostate:  High and tight bladder neck, mild lateral lobe hypertrophy, based on the aggravated of his gland I suspect that he would do poorly with Uro lift, would do better with a transurethral incision of the prostate  Bladder:   The bladder tumor is noted on a retroflexed view at the bladder neck on the right-hand side, this appears to be low-grade, 1 centimeter in size  Ureteral orifices:  Orthotopic, clear urine exiting  Other findings:  None, retroflexed view confirms    Specimens: None                 Complications:    None; patient tolerated the procedure well           Disposition: To home           Condition: Stable    Plan: High and tight bladder neck, likely causing the patient's symptoms, new diagnosis of a roughly 1 centimeter bladder tumor at the right side of the bladder neck, this will require TURBT       ]

## 2020-08-07 ENCOUNTER — TELEPHONE (OUTPATIENT)
Dept: UROLOGY | Facility: CLINIC | Age: 55
End: 2020-08-07

## 2020-08-07 NOTE — TELEPHONE ENCOUNTER
Patient returned call  Patient can be reached at [phone]   Patient stated  can also set-up with his wife Wendi Jauregui [phone]

## 2020-08-10 ENCOUNTER — PREP FOR PROCEDURE (OUTPATIENT)
Dept: UROLOGY | Facility: CLINIC | Age: 55
End: 2020-08-10

## 2020-08-10 DIAGNOSIS — R39.11 BENIGN PROSTATIC HYPERPLASIA WITH URINARY HESITANCY: Primary | ICD-10-CM

## 2020-08-10 DIAGNOSIS — N40.1 BENIGN PROSTATIC HYPERPLASIA WITH URINARY HESITANCY: Primary | ICD-10-CM

## 2020-08-10 NOTE — TELEPHONE ENCOUNTER
Spoke with Fanny Dash patients wife and he is sched for 9/22 at the [facility] with Dr Santoro  She is aware they will call the day prior with time of arrival and he will need a   He will go Sept 1st for PATs and is sched for 9/15 for H&P in our office  Advised 7 day hold of any blood thinners, aspirin products, multivitamins or fish oils  I will be mailing them a surgical packet with all of this information and she is aware to contact us with any questions or concerns

## 2020-09-01 ENCOUNTER — APPOINTMENT (OUTPATIENT)
Dept: LAB | Facility: AMBULARY SURGERY CENTER | Age: 55
End: 2020-09-01
Attending: UROLOGY
Payer: COMMERCIAL

## 2020-09-01 DIAGNOSIS — N40.1 BENIGN PROSTATIC HYPERPLASIA WITH URINARY HESITANCY: ICD-10-CM

## 2020-09-01 DIAGNOSIS — R39.11 BENIGN PROSTATIC HYPERPLASIA WITH URINARY HESITANCY: ICD-10-CM

## 2020-09-01 LAB
ANION GAP SERPL CALCULATED.3IONS-SCNC: 2 MMOL/L (ref 4–13)
APTT PPP: 55 SECONDS (ref 23–37)
BASOPHILS # BLD AUTO: 0.08 THOUSANDS/ΜL (ref 0–0.1)
BASOPHILS NFR BLD AUTO: 1 % (ref 0–1)
BUN SERPL-MCNC: 13 MG/DL (ref 5–25)
CALCIUM SERPL-MCNC: 8.9 MG/DL (ref 8.3–10.1)
CHLORIDE SERPL-SCNC: 108 MMOL/L (ref 100–108)
CO2 SERPL-SCNC: 29 MMOL/L (ref 21–32)
CREAT SERPL-MCNC: 0.84 MG/DL (ref 0.6–1.3)
EOSINOPHIL # BLD AUTO: 0.39 THOUSAND/ΜL (ref 0–0.61)
EOSINOPHIL NFR BLD AUTO: 5 % (ref 0–6)
ERYTHROCYTE [DISTWIDTH] IN BLOOD BY AUTOMATED COUNT: 13.2 % (ref 11.6–15.1)
GFR SERPL CREATININE-BSD FRML MDRD: 101 ML/MIN/1.73SQ M
GLUCOSE P FAST SERPL-MCNC: 97 MG/DL (ref 65–99)
HCT VFR BLD AUTO: 51.8 % (ref 36.5–49.3)
HGB BLD-MCNC: 16.8 G/DL (ref 12–17)
IMM GRANULOCYTES # BLD AUTO: 0.02 THOUSAND/UL (ref 0–0.2)
IMM GRANULOCYTES NFR BLD AUTO: 0 % (ref 0–2)
INR PPP: 0.99 (ref 0.84–1.19)
LYMPHOCYTES # BLD AUTO: 1.46 THOUSANDS/ΜL (ref 0.6–4.47)
LYMPHOCYTES NFR BLD AUTO: 20 % (ref 14–44)
MCH RBC QN AUTO: 30.3 PG (ref 26.8–34.3)
MCHC RBC AUTO-ENTMCNC: 32.4 G/DL (ref 31.4–37.4)
MCV RBC AUTO: 93 FL (ref 82–98)
MONOCYTES # BLD AUTO: 0.55 THOUSAND/ΜL (ref 0.17–1.22)
MONOCYTES NFR BLD AUTO: 8 % (ref 4–12)
NEUTROPHILS # BLD AUTO: 4.76 THOUSANDS/ΜL (ref 1.85–7.62)
NEUTS SEG NFR BLD AUTO: 66 % (ref 43–75)
NRBC BLD AUTO-RTO: 0 /100 WBCS
PLATELET # BLD AUTO: 227 THOUSANDS/UL (ref 149–390)
PMV BLD AUTO: 10.8 FL (ref 8.9–12.7)
POTASSIUM SERPL-SCNC: 4.9 MMOL/L (ref 3.5–5.3)
PROTHROMBIN TIME: 13.1 SECONDS (ref 11.6–14.5)
RBC # BLD AUTO: 5.55 MILLION/UL (ref 3.88–5.62)
SODIUM SERPL-SCNC: 139 MMOL/L (ref 136–145)
WBC # BLD AUTO: 7.26 THOUSAND/UL (ref 4.31–10.16)

## 2020-09-01 PROCEDURE — 85610 PROTHROMBIN TIME: CPT

## 2020-09-01 PROCEDURE — 80048 BASIC METABOLIC PNL TOTAL CA: CPT

## 2020-09-01 PROCEDURE — 85730 THROMBOPLASTIN TIME PARTIAL: CPT

## 2020-09-01 PROCEDURE — 36415 COLL VENOUS BLD VENIPUNCTURE: CPT

## 2020-09-01 PROCEDURE — 85025 COMPLETE CBC W/AUTO DIFF WBC: CPT

## 2020-09-01 PROCEDURE — 87086 URINE CULTURE/COLONY COUNT: CPT

## 2020-09-02 ENCOUNTER — TRANSCRIBE ORDERS (OUTPATIENT)
Dept: LAB | Facility: CLINIC | Age: 55
End: 2020-09-02

## 2020-09-02 ENCOUNTER — OFFICE VISIT (OUTPATIENT)
Dept: LAB | Facility: CLINIC | Age: 55
End: 2020-09-02
Payer: COMMERCIAL

## 2020-09-02 DIAGNOSIS — N40.1 BENIGN PROSTATIC HYPERPLASIA WITH URINARY HESITANCY: ICD-10-CM

## 2020-09-02 DIAGNOSIS — R39.11 BENIGN PROSTATIC HYPERPLASIA WITH URINARY HESITANCY: ICD-10-CM

## 2020-09-02 LAB — BACTERIA UR CULT: NORMAL

## 2020-09-02 PROCEDURE — 93005 ELECTROCARDIOGRAM TRACING: CPT

## 2020-09-03 LAB
ATRIAL RATE: 80 BPM
P AXIS: 64 DEGREES
PR INTERVAL: 132 MS
QRS AXIS: 82 DEGREES
QRSD INTERVAL: 100 MS
QT INTERVAL: 370 MS
QTC INTERVAL: 426 MS
T WAVE AXIS: 65 DEGREES
VENTRICULAR RATE: 80 BPM

## 2020-09-03 PROCEDURE — 93010 ELECTROCARDIOGRAM REPORT: CPT | Performed by: INTERNAL MEDICINE

## 2020-09-10 ENCOUNTER — ANESTHESIA EVENT (OUTPATIENT)
Dept: PERIOP | Facility: AMBULARY SURGERY CENTER | Age: 55
End: 2020-09-10
Payer: COMMERCIAL

## 2020-09-15 ENCOUNTER — OFFICE VISIT (OUTPATIENT)
Dept: UROLOGY | Facility: CLINIC | Age: 55
End: 2020-09-15
Payer: COMMERCIAL

## 2020-09-15 VITALS
TEMPERATURE: 96.8 F | HEIGHT: 66 IN | BODY MASS INDEX: 26.84 KG/M2 | SYSTOLIC BLOOD PRESSURE: 118 MMHG | WEIGHT: 167 LBS | DIASTOLIC BLOOD PRESSURE: 72 MMHG | HEART RATE: 72 BPM

## 2020-09-15 DIAGNOSIS — R39.11 BENIGN PROSTATIC HYPERPLASIA WITH URINARY HESITANCY: Primary | ICD-10-CM

## 2020-09-15 DIAGNOSIS — N40.1 BENIGN PROSTATIC HYPERPLASIA WITH URINARY HESITANCY: Primary | ICD-10-CM

## 2020-09-15 DIAGNOSIS — D49.4 BLADDER TUMOR: ICD-10-CM

## 2020-09-15 LAB
SL AMB  POCT GLUCOSE, UA: NORMAL
SL AMB LEUKOCYTE ESTERASE,UA: NORMAL
SL AMB POCT BILIRUBIN,UA: NORMAL
SL AMB POCT BLOOD,UA: NORMAL
SL AMB POCT CLARITY,UA: CLEAR
SL AMB POCT COLOR,UA: YELLOW
SL AMB POCT KETONES,UA: NORMAL
SL AMB POCT NITRITE,UA: NORMAL
SL AMB POCT PH,UA: 5
SL AMB POCT SPECIFIC GRAVITY,UA: 1.03
SL AMB POCT URINE PROTEIN: NORMAL
SL AMB POCT UROBILINOGEN: 0.2

## 2020-09-15 PROCEDURE — 99213 OFFICE O/P EST LOW 20 MIN: CPT | Performed by: PHYSICIAN ASSISTANT

## 2020-09-15 PROCEDURE — 87086 URINE CULTURE/COLONY COUNT: CPT | Performed by: PHYSICIAN ASSISTANT

## 2020-09-15 PROCEDURE — 81002 URINALYSIS NONAUTO W/O SCOPE: CPT | Performed by: PHYSICIAN ASSISTANT

## 2020-09-15 NOTE — PROGRESS NOTES
1  Benign prostatic hyperplasia with urinary hesitancy  POCT urine dip   2  Bladder tumor  POCT urine dip         Assessment and plan:     1  Bladder Tumor - managed by Dr Anthony Andrews  2  LUTS    Reviewed with the patient his upcoming procedure TURBT, retrograde pyelogram, ureteral stent insertion, and TUIP  Reviewed the preoperative, intraoperative, postoperative clinical course  Reviewed the risks of the procedure including but not limited to cardiopulmonary complication, VTE, bleeding, infection, urinary incontinence, bladder perforation, retrograde ejaculation, need for Henry catheter, need for additional procedures  Patient verbalized understanding  He is not on any anticoagulation  He was counseled to remain NPO after midnight prior to intervention  Patient is in the process of coordinating transportation to and from the procedure  Urine specimen from the office will be submitted for culture  He will be contacted if any antibiotics are needed  All questions answered  Farshad Potter PA-C      Chief Complaint     F/u     History of Present Illness     Kristal Downs is a 48 y o  male patient of Dr Anthony Andrews with a history of lower urinary tract symptoms a newly found bladder tumor presenting for history and physical for upcoming procedure  He has been interested in surgical therapies for his lower urinary tract symptoms given his young age and wish to avoid lifelong medical therapy  Has noted improvement with tamsulosin  In terms of his smoking he states that he smokes  between 1-2 packs of cigarettes per day  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[id number], rides motorcycles for fun  Patient underwent cystoscopy and transrectal ultrasound 08/05/2020  Cystoscopy revealed a high and tight bladder neck, mild lateral lobe hypertrophy, as well as a bladder tumor    Patient is scheduled for upcoming cystoscopy, TURBT, retrograde pyelogram with you bilateral ureteral stent insertion, TURP/TUIP on 09/22/2020 with Dr Salomon Trivedi  Urine dip in the office today is leukocyte, nitrite, and blood negative  Review of Systems     Review of Systems   Constitutional: Negative  HENT: Negative  Eyes: Negative  Respiratory: Negative  Cardiovascular: Negative  Gastrointestinal: Negative  Endocrine: Negative  Genitourinary: Positive for difficulty urinating, frequency and urgency  Also with intermittent stream   Musculoskeletal: Negative  Skin: Negative  Allergic/Immunologic: Negative  Neurological: Negative  Hematological: Negative  Psychiatric/Behavioral: Negative  Allergies     No Known Allergies    Physical Exam     Physical Exam  Vitals signs and nursing note reviewed  Constitutional:       General: He is not in acute distress  Appearance: Normal appearance  He is well-developed and normal weight  He is not ill-appearing, toxic-appearing or diaphoretic  Comments: Stigmata of tobacco abuse disorder are present   HENT:      Head: Normocephalic and atraumatic  Eyes:      General:         Right eye: No discharge  Left eye: No discharge  Neck:      Musculoskeletal: Normal range of motion and neck supple  Thyroid: No thyromegaly  Trachea: No tracheal deviation  Cardiovascular:      Rate and Rhythm: Normal rate  Pulses: Normal pulses  Heart sounds: No murmur  No friction rub  Pulmonary:      Effort: Pulmonary effort is normal  No respiratory distress  Breath sounds: Normal breath sounds  No stridor  No wheezing or rhonchi  Abdominal:      General: There is no distension  Palpations: Abdomen is soft  There is no mass  Tenderness: There is no abdominal tenderness  There is no guarding or rebound  Hernia: No hernia is present  Genitourinary:     Comments: Normal phallus, apart from chuck meatus, normal sphincter tone  Musculoskeletal: Normal range of motion           General: No swelling, tenderness, deformity or signs of injury  Lymphadenopathy:      Cervical: No cervical adenopathy  Skin:     General: Skin is warm and dry  Coloration: Skin is not pale  Findings: No erythema or rash  Neurological:      General: No focal deficit present  Mental Status: He is alert and oriented to person, place, and time  Cranial Nerves: No cranial nerve deficit  Sensory: No sensory deficit  Motor: No weakness or abnormal muscle tone  Coordination: Coordination normal    Psychiatric:         Behavior: Behavior normal          Thought Content: Thought content normal          Judgment: Judgment normal              Vital Signs  Vitals:    09/15/20 0824   BP: 118/72   Pulse: 72   Temp: (!) 96 8 °F (36 °C)   Weight: 75 8 kg (167 lb)   Height: 5' 6" (1 676 m)         Current Medications       Current Outpatient Medications:     tamsulosin (FLOMAX) 0 4 mg, Take 1 capsule (0 4 mg total) by mouth daily with dinner (Patient not taking: Reported on 9/15/2020), Disp: 90 capsule, Rfl: 3      Active Problems     Patient Active Problem List   Diagnosis    Acute otitis externa of right ear    Diverticulitis of large intestine with abscess without bleeding    Fatigue    Enlarged prostate with lower urinary tract symptoms (LUTS)         Past Medical History     Past Medical History:   Diagnosis Date    Depression     Diverticulitis of large intestine with abscess without bleeding          Surgical History     History reviewed  No pertinent surgical history  Family History     Family History   Problem Relation Age of Onset    Stroke Mother     Hypertension Father          Social History     Social History     Social History     Tobacco Use   Smoking Status Current Every Day Smoker    Packs/day: 1 00    Types: Cigarettes   Smokeless Tobacco Never Used   smokes about a pack a day, [employer] cigarettes         Pertinent Lab Values     Lab Results   Component Value Date    CREATININE 0 84 09/01/2020       Lab Results   Component Value Date    PSA 0 5 05/27/2020             Pertinent Imaging      There is no pertinent urological imaging for my review

## 2020-09-16 LAB — BACTERIA UR CULT: NORMAL

## 2020-09-21 PROBLEM — F32.A DEPRESSION: Status: ACTIVE | Noted: 2020-09-21

## 2020-09-21 NOTE — ANESTHESIA PREPROCEDURE EVALUATION
Procedure:  TRANSURETHRAL RESECTION OF BLADDER TUMOR (TURBT) (N/A Bladder)  CYSTOSCOPY RETROGRADE PYELOGRAM WITH INSERTION STENT URETERAL (Bilateral Bladder)  TRANSURETHRAL RESECTION OF PROSTATE W/ LASER/Transurethral incision of prostate, button electrode (N/A Abdomen)    Relevant Problems   ANESTHESIA (within normal limits)      CARDIO (within normal limits)      ENDO (within normal limits)      GI/HEPATIC (within normal limits)      /RENAL   (+) Enlarged prostate with lower urinary tract symptoms (LUTS)      HEMATOLOGY (within normal limits)      NEURO/PSYCH   (+) Depression      PULMONARY   (+) Smoking      Other   (+) Diverticulitis of large intestine with abscess without bleeding      EKG 9/2/2020:  Normal sinus rhythm  Possible Left atrial enlargement  When compared with ECG of 02-SEP-2020 08:14, (unconfirmed)  Previous ECG has undetermined rhythm, needs review    Lab Results   Component Value Date    WBC 7 26 09/01/2020    HGB 16 8 09/01/2020    HCT 51 8 (H) 09/01/2020    MCV 93 09/01/2020     09/01/2020     Lab Results   Component Value Date    SODIUM 139 09/01/2020    K 4 9 09/01/2020     09/01/2020    CO2 29 09/01/2020    BUN 13 09/01/2020    CREATININE 0 84 09/01/2020    GLUC 116 01/19/2020    CALCIUM 8 9 09/01/2020     Lab Results   Component Value Date    INR 0 99 09/01/2020    INR 1 15 01/08/2019    PROTIME 13 1 09/01/2020    PROTIME 14 4 (H) 01/08/2019     No results found for: HGBA1C       Physical Exam    Airway    Mallampati score: II  TM Distance: >3 FB       Dental   upper dentures,     Cardiovascular  Cardiovascular exam normal    Pulmonary  Pulmonary exam normal     Other Findings        Anesthesia Plan  ASA Score- 2     Anesthesia Type- general with ASA Monitors  Additional Monitors:   Airway Plan: LMA  Comment: Patient reporting left shoulder discomfort which is localized to small area over the rotator cuff   The pain does not radiate, he denies chest or neck pain, and denies any other symptoms  Began while patient laying in stretcher in preop area  Pain does not seem to be exacerbated by movement and is not reproducible  Pain has improved by time of my evaluation  All vital signs stable within patient's normal range  No EKG changes on continuous 5-lead monitor  Patient has no history of cardiac disease  Likely musculoskeletal etiology          Plan Factors-Exercise tolerance (METS): >4 METS  Chart reviewed  EKG reviewed  Existing labs reviewed  Patient summary reviewed  Induction- intravenous  Postoperative Plan-     Informed Consent- Anesthetic plan and risks discussed with patient  I personally reviewed this patient with the CRNA  Discussed and agreed on the Anesthesia Plan with the CRNA  Yaya Castanon

## 2020-09-22 ENCOUNTER — APPOINTMENT (OUTPATIENT)
Dept: RADIOLOGY | Facility: AMBULARY SURGERY CENTER | Age: 55
End: 2020-09-22
Payer: COMMERCIAL

## 2020-09-22 ENCOUNTER — HOSPITAL ENCOUNTER (OUTPATIENT)
Facility: AMBULARY SURGERY CENTER | Age: 55
Setting detail: OUTPATIENT SURGERY
Discharge: HOME | End: 2020-09-22
Attending: UROLOGY | Admitting: UROLOGY
Payer: COMMERCIAL

## 2020-09-22 ENCOUNTER — ANESTHESIA (OUTPATIENT)
Dept: PERIOP | Facility: AMBULARY SURGERY CENTER | Age: 55
End: 2020-09-22
Payer: COMMERCIAL

## 2020-09-22 ENCOUNTER — TELEPHONE (OUTPATIENT)
Dept: UROLOGY | Facility: CLINIC | Age: 55
End: 2020-09-22

## 2020-09-22 VITALS
TEMPERATURE: 97 F | SYSTOLIC BLOOD PRESSURE: 130 MMHG | HEIGHT: 66 IN | WEIGHT: 171.2 LBS | OXYGEN SATURATION: 96 % | HEART RATE: 67 BPM | DIASTOLIC BLOOD PRESSURE: 85 MMHG | RESPIRATION RATE: 12 BRPM | BODY MASS INDEX: 27.51 KG/M2

## 2020-09-22 VITALS — HEART RATE: 62 BPM

## 2020-09-22 DIAGNOSIS — R39.11 BENIGN PROSTATIC HYPERPLASIA WITH URINARY HESITANCY: ICD-10-CM

## 2020-09-22 DIAGNOSIS — D49.4 BLADDER TUMOR: Primary | ICD-10-CM

## 2020-09-22 DIAGNOSIS — N40.1 BENIGN PROSTATIC HYPERPLASIA WITH URINARY HESITANCY: ICD-10-CM

## 2020-09-22 PROBLEM — F17.200 SMOKING: Status: ACTIVE | Noted: 2020-09-22

## 2020-09-22 PROBLEM — IMO0001 SMOKING: Status: ACTIVE | Noted: 2020-09-22

## 2020-09-22 PROCEDURE — 88307 TISSUE EXAM BY PATHOLOGIST: CPT | Performed by: PATHOLOGY

## 2020-09-22 PROCEDURE — 52234 CYSTOSCOPY AND TREATMENT: CPT | Performed by: UROLOGY

## 2020-09-22 PROCEDURE — C1769 GUIDE WIRE: HCPCS | Performed by: UROLOGY

## 2020-09-22 PROCEDURE — NC001 PR NO CHARGE: Performed by: UROLOGY

## 2020-09-22 PROCEDURE — 52450 TRANSURETHRAL INC PROSTATE: CPT | Performed by: UROLOGY

## 2020-09-22 PROCEDURE — 74420 UROGRAPHY RTRGR +-KUB: CPT

## 2020-09-22 RX ORDER — FENTANYL CITRATE 50 UG/ML
INJECTION, SOLUTION INTRAMUSCULAR; INTRAVENOUS AS NEEDED
Status: DISCONTINUED | OUTPATIENT
Start: 2020-09-22 | End: 2020-09-22

## 2020-09-22 RX ORDER — PROPOFOL 10 MG/ML
INJECTION, EMULSION INTRAVENOUS AS NEEDED
Status: DISCONTINUED | OUTPATIENT
Start: 2020-09-22 | End: 2020-09-22

## 2020-09-22 RX ORDER — DOCUSATE SODIUM 100 MG/1
100 CAPSULE, LIQUID FILLED ORAL 3 TIMES DAILY
Qty: 42 CAPSULE | Refills: 0 | Status: SHIPPED | OUTPATIENT
Start: 2020-09-22 | End: 2020-10-06

## 2020-09-22 RX ORDER — GABAPENTIN 300 MG/1
300 CAPSULE ORAL ONCE
Status: COMPLETED | OUTPATIENT
Start: 2020-09-22 | End: 2020-09-22

## 2020-09-22 RX ORDER — CEFAZOLIN SODIUM 2 G/50ML
2000 SOLUTION INTRAVENOUS ONCE
Status: COMPLETED | OUTPATIENT
Start: 2020-09-22 | End: 2020-09-22

## 2020-09-22 RX ORDER — MAGNESIUM HYDROXIDE 1200 MG/15ML
LIQUID ORAL AS NEEDED
Status: DISCONTINUED | OUTPATIENT
Start: 2020-09-22 | End: 2020-09-22 | Stop reason: HOSPADM

## 2020-09-22 RX ORDER — MEPERIDINE HYDROCHLORIDE 25 MG/ML
12.5 INJECTION INTRAMUSCULAR; INTRAVENOUS; SUBCUTANEOUS ONCE AS NEEDED
Status: DISCONTINUED | OUTPATIENT
Start: 2020-09-22 | End: 2020-09-22 | Stop reason: HOSPADM

## 2020-09-22 RX ORDER — SODIUM CHLORIDE, SODIUM LACTATE, POTASSIUM CHLORIDE, CALCIUM CHLORIDE 600; 310; 30; 20 MG/100ML; MG/100ML; MG/100ML; MG/100ML
INJECTION, SOLUTION INTRAVENOUS CONTINUOUS PRN
Status: DISCONTINUED | OUTPATIENT
Start: 2020-09-22 | End: 2020-09-22

## 2020-09-22 RX ORDER — ONDANSETRON 2 MG/ML
INJECTION INTRAMUSCULAR; INTRAVENOUS AS NEEDED
Status: DISCONTINUED | OUTPATIENT
Start: 2020-09-22 | End: 2020-09-22

## 2020-09-22 RX ORDER — FUROSEMIDE 10 MG/ML
INJECTION INTRAMUSCULAR; INTRAVENOUS AS NEEDED
Status: DISCONTINUED | OUTPATIENT
Start: 2020-09-22 | End: 2020-09-22

## 2020-09-22 RX ORDER — PHENAZOPYRIDINE HYDROCHLORIDE 100 MG/1
100 TABLET, FILM COATED ORAL
Status: DISCONTINUED | OUTPATIENT
Start: 2020-09-22 | End: 2020-09-22 | Stop reason: HOSPADM

## 2020-09-22 RX ORDER — ALBUTEROL SULFATE 2.5 MG/3ML
2.5 SOLUTION RESPIRATORY (INHALATION) ONCE AS NEEDED
Status: DISCONTINUED | OUTPATIENT
Start: 2020-09-22 | End: 2020-09-22 | Stop reason: HOSPADM

## 2020-09-22 RX ORDER — KETOROLAC TROMETHAMINE 30 MG/ML
15 INJECTION, SOLUTION INTRAMUSCULAR; INTRAVENOUS EVERY 6 HOURS PRN
Status: DISCONTINUED | OUTPATIENT
Start: 2020-09-22 | End: 2020-09-22 | Stop reason: HOSPADM

## 2020-09-22 RX ORDER — SODIUM CHLORIDE, SODIUM LACTATE, POTASSIUM CHLORIDE, CALCIUM CHLORIDE 600; 310; 30; 20 MG/100ML; MG/100ML; MG/100ML; MG/100ML
125 INJECTION, SOLUTION INTRAVENOUS CONTINUOUS
Status: DISCONTINUED | OUTPATIENT
Start: 2020-09-22 | End: 2020-09-22 | Stop reason: HOSPADM

## 2020-09-22 RX ORDER — DIPHENHYDRAMINE HCL 25 MG
25 TABLET ORAL EVERY 6 HOURS PRN
Status: DISCONTINUED | OUTPATIENT
Start: 2020-09-22 | End: 2020-09-22 | Stop reason: HOSPADM

## 2020-09-22 RX ORDER — OXYCODONE HYDROCHLORIDE AND ACETAMINOPHEN 5; 325 MG/1; MG/1
1 TABLET ORAL EVERY 6 HOURS PRN
Qty: 8 TABLET | Refills: 0 | Status: SHIPPED | OUTPATIENT
Start: 2020-09-22 | End: 2020-09-27

## 2020-09-22 RX ORDER — DEXAMETHASONE SODIUM PHOSPHATE 10 MG/ML
INJECTION, SOLUTION INTRAMUSCULAR; INTRAVENOUS AS NEEDED
Status: DISCONTINUED | OUTPATIENT
Start: 2020-09-22 | End: 2020-09-22

## 2020-09-22 RX ORDER — OXYCODONE HYDROCHLORIDE AND ACETAMINOPHEN 5; 325 MG/1; MG/1
2 TABLET ORAL EVERY 6 HOURS PRN
Status: DISCONTINUED | OUTPATIENT
Start: 2020-09-22 | End: 2020-09-22 | Stop reason: HOSPADM

## 2020-09-22 RX ORDER — PHENAZOPYRIDINE HYDROCHLORIDE 200 MG/1
200 TABLET, FILM COATED ORAL
Qty: 6 TABLET | Refills: 0 | Status: SHIPPED | OUTPATIENT
Start: 2020-09-22 | End: 2020-09-25

## 2020-09-22 RX ORDER — MIDAZOLAM HYDROCHLORIDE 2 MG/2ML
INJECTION, SOLUTION INTRAMUSCULAR; INTRAVENOUS AS NEEDED
Status: DISCONTINUED | OUTPATIENT
Start: 2020-09-22 | End: 2020-09-22

## 2020-09-22 RX ORDER — ONDANSETRON 2 MG/ML
4 INJECTION INTRAMUSCULAR; INTRAVENOUS EVERY 6 HOURS PRN
Status: DISCONTINUED | OUTPATIENT
Start: 2020-09-22 | End: 2020-09-22 | Stop reason: HOSPADM

## 2020-09-22 RX ORDER — ONDANSETRON 2 MG/ML
4 INJECTION INTRAMUSCULAR; INTRAVENOUS ONCE AS NEEDED
Status: DISCONTINUED | OUTPATIENT
Start: 2020-09-22 | End: 2020-09-22 | Stop reason: HOSPADM

## 2020-09-22 RX ORDER — ACETAMINOPHEN 325 MG/1
650 TABLET ORAL EVERY 6 HOURS PRN
Status: DISCONTINUED | OUTPATIENT
Start: 2020-09-22 | End: 2020-09-22 | Stop reason: HOSPADM

## 2020-09-22 RX ORDER — ACETAMINOPHEN 325 MG/1
975 TABLET ORAL ONCE
Status: COMPLETED | OUTPATIENT
Start: 2020-09-22 | End: 2020-09-22

## 2020-09-22 RX ORDER — FENTANYL CITRATE/PF 50 MCG/ML
50 SYRINGE (ML) INJECTION
Status: DISCONTINUED | OUTPATIENT
Start: 2020-09-22 | End: 2020-09-22 | Stop reason: HOSPADM

## 2020-09-22 RX ORDER — LIDOCAINE HYDROCHLORIDE 10 MG/ML
INJECTION, SOLUTION EPIDURAL; INFILTRATION; INTRACAUDAL; PERINEURAL AS NEEDED
Status: DISCONTINUED | OUTPATIENT
Start: 2020-09-22 | End: 2020-09-22

## 2020-09-22 RX ORDER — OXYBUTYNIN CHLORIDE 5 MG/1
5 TABLET ORAL 3 TIMES DAILY PRN
Status: DISCONTINUED | OUTPATIENT
Start: 2020-09-22 | End: 2020-09-22 | Stop reason: HOSPADM

## 2020-09-22 RX ORDER — SULFAMETHOXAZOLE AND TRIMETHOPRIM 800; 160 MG/1; MG/1
1 TABLET ORAL EVERY 12 HOURS SCHEDULED
Qty: 4 TABLET | Refills: 0 | Status: SHIPPED | OUTPATIENT
Start: 2020-09-22 | End: 2020-09-25

## 2020-09-22 RX ADMIN — OXYCODONE HYDROCHLORIDE AND ACETAMINOPHEN 2 TABLET: 5; 325 TABLET ORAL at 15:03

## 2020-09-22 RX ADMIN — FENTANYL CITRATE 50 MCG: 50 INJECTION, SOLUTION INTRAMUSCULAR; INTRAVENOUS at 12:37

## 2020-09-22 RX ADMIN — FUROSEMIDE 20 MG: 10 INJECTION, SOLUTION INTRAMUSCULAR; INTRAVENOUS at 13:05

## 2020-09-22 RX ADMIN — MIDAZOLAM HYDROCHLORIDE 2 MG: 1 INJECTION, SOLUTION INTRAMUSCULAR; INTRAVENOUS at 12:33

## 2020-09-22 RX ADMIN — LIDOCAINE HYDROCHLORIDE 50 MG: 10 INJECTION, SOLUTION EPIDURAL; INFILTRATION; INTRACAUDAL; PERINEURAL at 12:37

## 2020-09-22 RX ADMIN — ONDANSETRON 4 MG: 2 INJECTION INTRAMUSCULAR; INTRAVENOUS at 12:39

## 2020-09-22 RX ADMIN — DEXAMETHASONE SODIUM PHOSPHATE 4 MG: 10 INJECTION, SOLUTION INTRAMUSCULAR; INTRAVENOUS at 12:39

## 2020-09-22 RX ADMIN — FENTANYL CITRATE 50 MCG: 50 INJECTION INTRAMUSCULAR; INTRAVENOUS at 13:49

## 2020-09-22 RX ADMIN — FENTANYL CITRATE 50 MCG: 50 INJECTION INTRAMUSCULAR; INTRAVENOUS at 13:45

## 2020-09-22 RX ADMIN — PROPOFOL 200 MG: 10 INJECTION, EMULSION INTRAVENOUS at 12:37

## 2020-09-22 RX ADMIN — CEFAZOLIN SODIUM 2000 MG: 2 SOLUTION INTRAVENOUS at 12:33

## 2020-09-22 RX ADMIN — FENTANYL CITRATE 50 MCG: 50 INJECTION, SOLUTION INTRAMUSCULAR; INTRAVENOUS at 13:00

## 2020-09-22 RX ADMIN — ACETAMINOPHEN 975 MG: 325 TABLET, FILM COATED ORAL at 11:00

## 2020-09-22 RX ADMIN — GABAPENTIN 300 MG: 300 CAPSULE ORAL at 11:00

## 2020-09-22 RX ADMIN — SODIUM CHLORIDE, SODIUM LACTATE, POTASSIUM CHLORIDE, AND CALCIUM CHLORIDE: .6; .31; .03; .02 INJECTION, SOLUTION INTRAVENOUS at 11:00

## 2020-09-22 NOTE — OP NOTE
OPERATIVE REPORT  PATIENT NAME: Giorgio Anglin    :  1965  MRN: [id number]  Pt Location: [facility]    SURGERY DATE: 2020    Surgeon(s) and Role:     * Parag Sanchez MD - Primary    Preop Diagnosis:  Benign prostatic hyperplasia with urinary hesitancy [N40 1, R39 11]  Bladder tumor [D49 4]    Post-Op Diagnosis Codes: * Benign prostatic hyperplasia with urinary hesitancy [N40 1, R39 11]     * Bladder tumor [D49 4]    Procedure(s) (LRB):  TRANSURETHRAL RESECTION OF BLADDER TUMOR (TURBT) (N/A)  CYSTOSCOPY RETROGRADE PYELOGRAM WITH INSERTION STENT URETERAL (Bilateral)  Transurethral incision of prostate (N/A)    Specimen(s):  ID Type Source Tests Collected by Time Destination   1 : BLADDER TUMOR AT RIGHT BLADDER NECK Tissue Urinary Bladder TISSUE EXAM Parag Sanchez MD 2020 [id number]        Estimated Blood Loss:   Minimal    Drains:  Urethral Catheter Latex 22 Fr  (Active)   Number of days: 0       Anesthesia Type:   General    Operative Indications:  Benign prostatic hyperplasia with urinary hesitancy [N40 1, R39 11]  Bladder tumor [D49 4]      Operative Findings:  Normal bilateral retrograde pyelography, no other bladder tumors, a small tumor measuring less than 1 centimeter from the right bladder neck was sent for permanent specimen  A transurethral incision of the prostate with a needle electrode was then performed with a good relaxation of circular muscle fibers at the bladder neck    Complications:   None    Procedure and Technique:    PROCEDURE SUMMARY:    On an outpatient basis the patient's urological workup revealed a bladder tumor at the Bladder neck and trigone of the bladder  The management of bladder tumors including Transurethral resection of bladder tumor was discussed with the patient  All risks benefits and alternatives of this procedure were discussed with the patient as well   Witnessed informed consent for the proposed procedure was obtained and medical optimization was also obtained for this patient  The patient was brought to the operating room and placed in the supine position for the induction of general anesthesia  A full time-out confirmed the proper patient, position, and procedure  The patient was then placed in the lithotomy position and prepped and draped using the standard sterile technique  All pressure points were carefully padded there was no undue pressure on any bony prominences, muscle bellies, or nervous structures     Prior to the procedure antibiotics were administered as per the guidelines, and thromboembolism prophylaxis was administered in the form of sequential compression devices     A 32 F resectoscope was inserted into the urethra after gentle dilation of the urethral meatus to 30 F using standard urethral sounds  The urethra and bladder were then carefully inspected  Bimanual exam under anesthesia findings:    mobile pelvic structures with non-palpable disease  A belladonna and opiate suppository was also placed for patient comfort    Cystoscopic findings:    Urethra:  Normal  Prostate:  Normal  Bladder:  Lesion identified, characteristics below  Ureteral orifices: Normal orthotopic position with clear effluent   Urachus:   Normal    Lesion characteristics:  -Appearance:    Papillary  -Number of foci:   1  -Aggregate tumor diameter:  1 cm  -Largest individual tumor:  1 cm  -Location:    Right bladder neck  -Clinical stage: Ta    Upper tract evaluation (retrograde pyelography and radiological findings):    Each ureter was cannulated with a 5F ureteral catheter and retrograde pyelography was then performed using 15 milliliters of contrast between the collecting systems  The following findings were noted:    -Right renal pelvis and calyces: No stones, no filling defects, no hydronephrosis    -Right ureter:    No stones, no filling defects, no dilation   -Left renal pelvis and calyces: No stones, no filling defects, no hydronephrosis  -Left ureter:    No stones, no filling defects, no dilation  The urine was then seen draining normally from the upper urinary tracts  Bladder tumor resection:    Using a resectoscope, all abnormal lesions noted above were resected and sent for pathology  Resection craters and surrounding urothelium were electrofulgurated and hemostasis was achieved  All instrument counts and sponge counts were correct  A needle electrode was then used to perform a bilateral transurethral incision of the bladder neck and prostate as is customary, at the 05:00 o'clock and 07:00 o'clock positions  A 22 Panamanian catheter was placed for bladder drainage purposes, the patient was administered 20 milligrams of Lasix as well      DISPOSITION:  PACU to home  Plan:  Patient will be discharged home with a Henry catheter  Our office will arrange follow up with me in 2 weeks    Henry catheter will be removed at the end of the week             I was present for the entire procedure and A qualified resident physician was not available    Patient Disposition:  PACU     SIGNATURE: Dustin Kenney MD  DATE: September 22, 2020  TIME: 1:09 PM

## 2020-09-22 NOTE — H&P
The patient was seen and examined  There are no changes from the prior outpatient history and physical examination  The patient is appropriately prepared for surgery today as planned      /64   Pulse 58   Temp 97 5 °F (36 4 °C) (Temporal)   Resp 18   Ht 5' 6" (1 676 m)   Wt 77 7 kg (171 lb 3 2 oz)   SpO2 96%   BMI 27 63 kg/m²     Consent signed for TURBT and TUIP    Proceed to surgery as planned

## 2020-09-22 NOTE — ANESTHESIA POSTPROCEDURE EVALUATION
Post-Op Assessment Note    CV Status:  Stable  Pain Score: 0    Pain management: adequate     Mental Status:  Alert and unresponsive   Hydration Status:  Euvolemic   PONV Controlled:  Controlled   Airway Patency:  Patent      Post Op Vitals Reviewed: Yes      Staff: Anesthesiologist, CRNA         No complications documented      BP   106/66   Temp   96 8   Pulse  54   Resp   16   SpO2   98%

## 2020-09-22 NOTE — TELEPHONE ENCOUNTER
Patient still currently admitted  Henry removal and void trial scheduled for 9/25/20 with Peg Grande RN at 830am and 2 pm respectively  He already has two week follow up scheduled for 10/7/20 at 11am      Please confirm on discharge

## 2020-09-22 NOTE — DISCHARGE INSTRUCTIONS
Transurethral Resection of Bladder Tumors   WHAT YOU NEED TO KNOW:     Lisa Raphael,    Today you had a transurethral section of bladder tumor, along with bilateral retrograde pyelography, and transurethral incision of your prostate  In [place], we removed the small growth in the bladder, we shot some dye in both of your kidneys which showed no tumors in the upper tract (excellent news), and we cut circular muscle fibers that were keeping you from urinating well  You will need a catheter until the end of the week, this will be removed in the office at that time  Once your catheter is removed you will have some blood in the urine and urgency and frequency as well, which will improve with the passing of time  You will see me back in roughly 2 weeks to review your pathology from today's case and come up with a plan long-term in terms of what follow-up an additional therapy will need  After surgery such as this it is normal to have urgency, frequency, and discomfort when you urinate, again this will improve with the passing of time  If you have questions or concerns, please let us know  We hope that you recover well,  Dr Jean Rueda  Transurethral resection of bladder tumors (TURBT) is surgery to remove one or more tumors from your bladder  DISCHARGE INSTRUCTIONS:   Medicines:   · Medicines  help decrease pain or prevent vomiting  · Take your medicine as directed  Contact your healthcare provider if you think your medicine is not helping or if you have side effects  Tell him or her if you are allergic to any medicine  Keep a list of the medicines, vitamins, and herbs you take  Include the amounts, and when and why you take them  Bring the list or the pill bottles to follow-up visits  Carry your medicine list with you in case of an emergency  Follow up with your healthcare provider as directed:  Write down your questions so you remember to ask them during your visits     Care for your Henry catheter:  Keep the bag below your waist  This will prevent urine from flowing back into your bladder and causing an infection or other problems  Also, keep the tube free of kinks so the urine will drain properly  Do not pull on the catheter  This can cause pain and bleeding and may cause the catheter to come out  Empty your urine drainage bag when it is ½ to ? full, or every 8 hours  If you have a smaller leg bag, empty it every 3 to 4 hours  Do the following when you empty your urine drainage bag:  · Hold the urine bag over the toilet or a large container  · Remove the drain spout from its sleeve at the bottom of the urine bag  Do not touch the tip of the drain spout  Open the slide valve on the spout  · Let the urine flow out of the urine bag into the toilet or container  Do not let the drainage tube touch anything  · Clean the end of the drain spout with alcohol when the bag is empty  Ask which cleaning solution is best to use  · Close the slide valve and put the drain spout into its sleeve at the bottom of the urine bag  Write down how much urine was in your bag if you were asked to keep a record  Contact your healthcare provider if:   · You have a fever or chills  · You have new or more blood in your urine  · You have nausea or are vomiting  · You have new or more pain when you urinate  · You are unable to control when you urinate  · You have questions or concerns about your condition or care  Seek care immediately or call 911 if:   · You have heavy bleeding from your urethra  · You start to urinate less often, very little, or not at all  · You have severe pain in your abdomen or pelvis  © 2017 [employer] Information is for End User's use only and may not be sold, redistributed or otherwise used for commercial purposes  All illustrations and images included in CareNotes® are the copyrighted property of A [employer] M [employer]  or [employer]    The above information is an  only  It is not intended as medical advice for individual conditions or treatments  Talk to your doctor, nurse or pharmacist before following any medical regimen to see if it is safe and effective for you

## 2020-09-22 NOTE — TELEPHONE ENCOUNTER
My office will arrange for Henry removal and trial of void on Friday, he will see me back in roughly 2 weeks for discussion of pathology results

## 2020-09-22 NOTE — PROGRESS NOTES
Henry catheter and discharge instructions thoroughly discussed with patient  No questions/ concerns at this time  Told to call Dr Harjeet Frederick if questions/concerns arise

## 2020-09-25 ENCOUNTER — TELEPHONE (OUTPATIENT)
Dept: UROLOGY | Facility: CLINIC | Age: 55
End: 2020-09-25

## 2020-09-25 ENCOUNTER — PROCEDURE VISIT (OUTPATIENT)
Dept: UROLOGY | Facility: CLINIC | Age: 55
End: 2020-09-25

## 2020-09-25 VITALS
BODY MASS INDEX: 27.97 KG/M2 | SYSTOLIC BLOOD PRESSURE: 144 MMHG | WEIGHT: 174 LBS | TEMPERATURE: 97.1 F | DIASTOLIC BLOOD PRESSURE: 70 MMHG | RESPIRATION RATE: 18 BRPM | HEIGHT: 66 IN

## 2020-09-25 DIAGNOSIS — Z72.0 TOBACCO ABUSE: Primary | ICD-10-CM

## 2020-09-25 PROCEDURE — 99024 POSTOP FOLLOW-UP VISIT: CPT

## 2020-09-25 RX ORDER — NICOTINE 21 MG/24HR
1 PATCH, TRANSDERMAL 24 HOURS TRANSDERMAL EVERY 24 HOURS
Qty: 28 PATCH | Refills: 0 | Status: SHIPPED | OUTPATIENT
Start: 2020-09-25

## 2020-09-25 NOTE — PROGRESS NOTES
9/25/2020    Amara Ramirez  1965  [id number]    Diagnosis  Chief Complaint     Benign Prostatic Hypertrophy; bladder mass          Patient presents for clarke removal and void trial s/p TUIP/TURBT on 9/22/20 managed by Dr Rubina Norman  Patient will follow up as scheduled for post op visit  Procedure Clarke removal/voiding trial    Clarke catheter removed after deflation of an intact balloon  Patient tolerated well  Encouraged patient to hydrate well and return this afternoon for post void residual   He states he does not feel like he needs to come back for afternoon visit  Advised we do like to ensure he had emptying bladder well after clarke removal  I will call him this afternoon to ensure he is urinating  Patient and wife agreeable to plan  Patient reports much discomfort in his penis and both flanks  Per OP note it was notes that procedure included bilateral stent placement  Advised I would check with Dr Jose Sin as I did not see any placement annotated within his notes  In the meantime advised aggressive hydration, avoidance of bladder irritants and NSAIDs as needed  Consulted with Dr Jose Sin after visit  Stents were not placed for this patient, this was an error in the OP note which was fixed  Called and spoke with patient this afternoon at 230pm  He reports he urinated 4 times since the catheter removal  It felt like a normal amount and his penile and back pain is subsiding  Confirmed he does not have stents in place  Patient knows to follow up as scheduled and call in the meantime with any issues         Vitals:    09/25/20 0839   BP: 144/70   BP Location: Left arm   Patient Position: Sitting   Cuff Size: Standard   Resp: 18   Temp: (!) 97 1 °F (36 2 °C)   Weight: 78 9 kg (174 lb)   Height: 5' 6" (1 676 m)           Svitlana Bella RN BSN

## 2020-09-25 NOTE — TELEPHONE ENCOUNTER
Please note that in the operative note from the other day the automatic operative record has incorrectly stated that patient has bilateral stents, he does not

## 2020-10-07 ENCOUNTER — OFFICE VISIT (OUTPATIENT)
Dept: UROLOGY | Facility: CLINIC | Age: 55
End: 2020-10-07
Payer: COMMERCIAL

## 2020-10-07 VITALS
WEIGHT: 175 LBS | HEART RATE: 76 BPM | BODY MASS INDEX: 28.12 KG/M2 | TEMPERATURE: 97.2 F | SYSTOLIC BLOOD PRESSURE: 118 MMHG | DIASTOLIC BLOOD PRESSURE: 72 MMHG | HEIGHT: 66 IN | RESPIRATION RATE: 18 BRPM

## 2020-10-07 DIAGNOSIS — N40.1 BENIGN PROSTATIC HYPERPLASIA WITH URINARY HESITANCY: ICD-10-CM

## 2020-10-07 DIAGNOSIS — D41.4 BLADDER POLYP: Primary | ICD-10-CM

## 2020-10-07 DIAGNOSIS — R39.11 BENIGN PROSTATIC HYPERPLASIA WITH URINARY HESITANCY: ICD-10-CM

## 2020-10-07 DIAGNOSIS — F17.200 SMOKING: ICD-10-CM

## 2020-10-07 PROBLEM — D49.4 BLADDER TUMOR: Status: RESOLVED | Noted: 2020-09-22 | Resolved: 2020-10-07

## 2020-10-07 LAB — POST-VOID RESIDUAL VOLUME, ML POC: 13 ML

## 2020-10-07 PROCEDURE — 99213 OFFICE O/P EST LOW 20 MIN: CPT | Performed by: UROLOGY

## 2020-10-07 PROCEDURE — 51798 US URINE CAPACITY MEASURE: CPT | Performed by: UROLOGY

## 2020-11-15 NOTE — TELEPHONE ENCOUNTER
LM on voicemail and with wife in regards to scheduling surgery  RN informed floor Pt is ready for admission.

## 2021-07-23 ENCOUNTER — VBI (OUTPATIENT)
Dept: ADMINISTRATIVE | Facility: OTHER | Age: 56
End: 2021-07-23

## 2022-05-09 ENCOUNTER — OFFICE VISIT (OUTPATIENT)
Dept: FAMILY MEDICINE CLINIC | Facility: CLINIC | Age: 57
End: 2022-05-09
Payer: COMMERCIAL

## 2022-05-09 VITALS
DIASTOLIC BLOOD PRESSURE: 60 MMHG | HEART RATE: 94 BPM | SYSTOLIC BLOOD PRESSURE: 126 MMHG | RESPIRATION RATE: 16 BRPM | WEIGHT: 176 LBS | HEIGHT: 66 IN | BODY MASS INDEX: 28.28 KG/M2 | OXYGEN SATURATION: 96 %

## 2022-05-09 DIAGNOSIS — Z13.220 SCREENING, LIPID: ICD-10-CM

## 2022-05-09 DIAGNOSIS — Z00.00 PREVENTATIVE HEALTH CARE: Primary | ICD-10-CM

## 2022-05-09 DIAGNOSIS — F17.200 SMOKING: ICD-10-CM

## 2022-05-09 DIAGNOSIS — Z23 NEED FOR VACCINATION: ICD-10-CM

## 2022-05-09 DIAGNOSIS — Z00.00 ANNUAL PHYSICAL EXAM: ICD-10-CM

## 2022-05-09 DIAGNOSIS — Z12.5 SCREENING FOR PROSTATE CANCER: ICD-10-CM

## 2022-05-09 DIAGNOSIS — Z12.11 SCREEN FOR COLON CANCER: ICD-10-CM

## 2022-05-09 PROBLEM — H60.501 ACUTE OTITIS EXTERNA OF RIGHT EAR: Status: RESOLVED | Noted: 2018-07-30 | Resolved: 2022-05-09

## 2022-05-09 PROBLEM — F32.A DEPRESSION: Status: RESOLVED | Noted: 2020-09-21 | Resolved: 2022-05-09

## 2022-05-09 PROBLEM — R53.83 FATIGUE: Status: RESOLVED | Noted: 2020-06-04 | Resolved: 2022-05-09

## 2022-05-09 PROBLEM — N40.1 ENLARGED PROSTATE WITH LOWER URINARY TRACT SYMPTOMS (LUTS): Status: RESOLVED | Noted: 2020-08-03 | Resolved: 2022-05-09

## 2022-05-09 PROBLEM — K57.20 DIVERTICULITIS OF LARGE INTESTINE WITH ABSCESS WITHOUT BLEEDING: Status: RESOLVED | Noted: 2019-01-09 | Resolved: 2022-05-09

## 2022-05-09 PROCEDURE — 3725F SCREEN DEPRESSION PERFORMED: CPT | Performed by: FAMILY MEDICINE

## 2022-05-09 PROCEDURE — 3008F BODY MASS INDEX DOCD: CPT | Performed by: FAMILY MEDICINE

## 2022-05-09 PROCEDURE — 90715 TDAP VACCINE 7 YRS/> IM: CPT | Performed by: FAMILY MEDICINE

## 2022-05-09 PROCEDURE — 90471 IMMUNIZATION ADMIN: CPT | Performed by: FAMILY MEDICINE

## 2022-05-09 PROCEDURE — 99386 PREV VISIT NEW AGE 40-64: CPT | Performed by: FAMILY MEDICINE

## 2022-05-09 NOTE — PATIENT INSTRUCTIONS

## 2022-05-09 NOTE — PROGRESS NOTES
ADULT ANNUAL [facility]    NAME: Flor Hernandez  AGE: 49 y o  SEX: male  : 1965     DATE: 2022     Assessment and Plan:     Problem List Items Addressed This Visit        Other    Smoking      Patient is currently smoking but wants to quit  Educate on the negative effects of Tobacco   Recommend quitting smoking  Listed cessation options,  Including smoking cessation program    Patient wants to  Make an effort on his own by reducing the number of cigarettes  If he is unable to do so then he will follow up again to discuss treatment options  Preventative health care - Primary     Due for both colon and prostate cancer screening  Received adacel  Relevant Orders    Comprehensive metabolic panel    Lipid panel      Other Visit Diagnoses     Screening for prostate cancer        Relevant Orders    PSA, Total Screen    Screen for colon cancer        Relevant Orders    Cologuard    Screening, lipid        Relevant Orders    Lipid panel    Need for vaccination        Relevant Orders    TDAP VACCINE GREATER THAN OR EQUAL TO 8YO IM (Completed)          Immunizations and preventive care screenings were discussed with patient today  Appropriate education was printed on patient's after visit summary  Counseling:  Dental Health: discussed importance of regular tooth brushing, flossing, and dental visits  · Exercise: the importance of regular exercise/physical activity was discussed  Recommend exercise 3-5 times per week for at least 30 minutes  BMI Counseling: Body mass index is 28 41 kg/m²  The BMI is above normal  Nutrition recommendations include decreasing portion sizes, encouraging healthy choices of fruits and vegetables and decreasing fast food intake  Exercise recommendations include moderate physical activity 150 minutes/week  No pharmacotherapy was ordered   Rationale for BMI follow-up plan is due to patient being overweight or obese  No follow-ups on file  Chief Complaint:     Chief Complaint   Patient presents with    [facility]      History of Present Illness:     Adult Annual Physical   Patient here for a comprehensive physical exam  The patient reports no problems  Diet and Physical Activity  · Diet/Nutrition: well balanced diet  · Exercise: walking  Depression Screening  PHQ-2/9 Depression Screening    Little interest or pleasure in doing things: 0 - not at all  Feeling down, depressed, or hopeless: 0 - not at all  Trouble falling or staying asleep, or sleeping too much: 0 - not at all  Feeling tired or having little energy: 0 - not at all  Poor appetite or overeatin - not at all  Feeling bad about yourself - or that you are a failure or have let yourself or your family down: 0 - not at all  Trouble concentrating on things, such as reading the newspaper or watching television: 0 - not at all  Moving or speaking so slowly that other people could have noticed  Or the opposite - being so fidgety or restless that you have been moving around a lot more than usual: 0 - not at all  Thoughts that you would be better off dead, or of hurting yourself in some way: 0 - not at all  PHQ-9 Score: 0   PHQ-9 Interpretation: No or Minimal depression        General Health  · Sleep: sleeps well  · Hearing: normal - bilateral   · Vision: no vision problems  · Dental: regular dental visits   Health  · Symptoms include: none     Review of Systems:     Review of Systems   Constitutional: Negative  Respiratory: Negative  Cardiovascular: Negative  Gastrointestinal: Negative  Musculoskeletal: Negative  Negative for myalgias  Neurological: Negative  Psychiatric/Behavioral: Negative         Past Medical History:     Past Medical History:   Diagnosis Date    Depression     Diverticulitis of large intestine with abscess without bleeding       Past Surgical History:     Past Surgical History:   Procedure Laterality Date    FL RETROGRADE PYELOGRAM  9/22/2020    VT CYSTOURETHROSCOPY,FULGUR 0 5-2 CM LESN N/A 9/22/2020    Procedure: TRANSURETHRAL RESECTION OF BLADDER TUMOR (TURBT);   Surgeon: Amanda Pryor MD;  [facility];  Service: Urology    VT CYSTOURETHROSCOPY,URETER CATHETER Bilateral 9/22/2020    Procedure: [place];  Surgeon: Amanda Pryor MD;  [facility];  Service: Urology    TRANSURETHRAL RESECTION OF PROSTATE N/A 9/22/2020    Procedure: Transurethral incision of prostate;  Surgeon: Amanda Pryor MD;  [facility];  Service: Urology    WISDOM TOOTH EXTRACTION        Family History:     Family History   Problem Relation Age of Onset    Stroke Mother     Hypertension Father       Social History:     Social History     Socioeconomic History    Marital status: /Civil Union     Spouse name: None    Number of children: None    Years of education: None    Highest education level: None   Occupational History    None   Tobacco Use    Smoking status: Current Every Day Smoker     Packs/day: 1 00     Years: 45 00     Pack years: 45 00     Types: Cigarettes    Smokeless tobacco: Never Used   Vaping Use    Vaping Use: Never used   Substance and Sexual Activity    Alcohol use: No    Drug use: No     Comment: last used crack/cocaine in 2000    Sexual activity: None   Other Topics Concern    None   Social History Narrative    None     Social Determinants of Health     Financial Resource Strain: Not on file   Food Insecurity: Not on file   Transportation Needs: Not on file   Physical Activity: Not on file   Stress: Not on file   Social Connections: Not on file   Intimate Partner Violence: Not on file   Housing Stability: Not on file      Current Medications:     Current Outpatient Medications   Medication Sig Dispense Refill    docusate sodium (COLACE) 100 mg capsule Take 1 capsule (100 mg total) by mouth 3 (three) times a day for 14 days 42 capsule 0    nicotine (NICODERM CQ) 14 mg/24hr TD 24 hr patch Place 1 patch on the skin every 24 hours (Patient not taking: Reported on 10/7/2020) 28 patch 0     No current facility-administered medications for this visit  Allergies:     No Known Allergies   Physical Exam:     /60   Pulse 94   Resp 16   Ht 5' 6" (1 676 m)   Wt 79 8 kg (176 lb)   SpO2 96%   BMI 28 41 kg/m²     Physical Exam  Vitals and nursing note reviewed  Constitutional:       Appearance: He is well-developed  HENT:      Right Ear: External ear normal       Left Ear: External ear normal    Eyes:      Pupils: Pupils are equal, round, and reactive to light  Cardiovascular:      Rate and Rhythm: Normal rate and regular rhythm  Heart sounds: Normal heart sounds  Pulmonary:      Effort: Pulmonary effort is normal       Breath sounds: Normal breath sounds  Abdominal:      Palpations: Abdomen is soft  There is no mass  Tenderness: There is no abdominal tenderness  There is no guarding  Musculoskeletal:         General: Normal range of motion  Cervical back: Normal range of motion and neck supple  Neurological:      Mental Status: He is alert and oriented to person, place, and time  Psychiatric:         Behavior: Behavior normal          Thought Content: Thought content normal          Judgment: Judgment normal           Michaela Barrios MD  [facility]  Depression Screening Follow-up Plan: Patient's depression screening was positive with a PHQ-2 score of 0  Their PHQ-9 score was 0  Clinically patient does not have depression  No treatment is required

## 2022-05-20 ENCOUNTER — TELEPHONE (OUTPATIENT)
Dept: FAMILY MEDICINE CLINIC | Facility: CLINIC | Age: 57
End: 2022-05-20

## 2022-05-20 ENCOUNTER — APPOINTMENT (OUTPATIENT)
Dept: LAB | Facility: AMBULARY SURGERY CENTER | Age: 57
End: 2022-05-20
Payer: COMMERCIAL

## 2022-05-20 DIAGNOSIS — E78.2 MIXED HYPERLIPIDEMIA: Primary | ICD-10-CM

## 2022-05-20 DIAGNOSIS — Z12.5 SCREENING FOR PROSTATE CANCER: ICD-10-CM

## 2022-05-20 DIAGNOSIS — R73.01 ELEVATED FASTING BLOOD SUGAR: ICD-10-CM

## 2022-05-20 DIAGNOSIS — Z00.00 PREVENTATIVE HEALTH CARE: ICD-10-CM

## 2022-05-20 DIAGNOSIS — Z13.220 SCREENING, LIPID: ICD-10-CM

## 2022-05-20 LAB
ALBUMIN SERPL BCP-MCNC: 3.9 G/DL (ref 3.5–5)
ALP SERPL-CCNC: 80 U/L (ref 46–116)
ALT SERPL W P-5'-P-CCNC: 29 U/L (ref 12–78)
ANION GAP SERPL CALCULATED.3IONS-SCNC: 3 MMOL/L (ref 4–13)
AST SERPL W P-5'-P-CCNC: 17 U/L (ref 5–45)
BILIRUB SERPL-MCNC: 1.15 MG/DL (ref 0.2–1)
BUN SERPL-MCNC: 15 MG/DL (ref 5–25)
CALCIUM SERPL-MCNC: 9.9 MG/DL (ref 8.3–10.1)
CHLORIDE SERPL-SCNC: 106 MMOL/L (ref 100–108)
CHOLEST SERPL-MCNC: 203 MG/DL
CO2 SERPL-SCNC: 30 MMOL/L (ref 21–32)
CREAT SERPL-MCNC: 0.87 MG/DL (ref 0.6–1.3)
GFR SERPL CREATININE-BSD FRML MDRD: 97 ML/MIN/1.73SQ M
GLUCOSE P FAST SERPL-MCNC: 109 MG/DL (ref 65–99)
HDLC SERPL-MCNC: 53 MG/DL
LDLC SERPL CALC-MCNC: 130 MG/DL (ref 0–100)
NONHDLC SERPL-MCNC: 150 MG/DL
POTASSIUM SERPL-SCNC: 4.5 MMOL/L (ref 3.5–5.3)
PROT SERPL-MCNC: 6.9 G/DL (ref 6.4–8.2)
PSA SERPL-MCNC: 0.5 NG/ML (ref 0–4)
SODIUM SERPL-SCNC: 139 MMOL/L (ref 136–145)
TRIGL SERPL-MCNC: 98 MG/DL

## 2022-05-20 PROCEDURE — 80053 COMPREHEN METABOLIC PANEL: CPT

## 2022-05-20 PROCEDURE — 80061 LIPID PANEL: CPT

## 2022-05-20 PROCEDURE — 36415 COLL VENOUS BLD VENIPUNCTURE: CPT

## 2022-05-20 PROCEDURE — G0103 PSA SCREENING: HCPCS

## 2022-05-20 NOTE — TELEPHONE ENCOUNTER
----- Message from Adelia Harris MD sent at 5/20/2022 12:34 PM EDT -----  Please call the patient regarding his abnormal result  His fasting blood sugar and cholesterol is borderline  I would recommend low carb low-fat diet  Continue monitoring  Mail labs to the patient

## 2022-05-25 ENCOUNTER — VBI (OUTPATIENT)
Dept: ADMINISTRATIVE | Facility: OTHER | Age: 57
End: 2022-05-25

## 2022-05-31 ENCOUNTER — TELEPHONE (OUTPATIENT)
Dept: FAMILY MEDICINE CLINIC | Facility: CLINIC | Age: 57
End: 2022-05-31

## 2022-05-31 NOTE — TELEPHONE ENCOUNTER
----- Message from Oddis Babinski, MD sent at 5/27/2022  8:18 AM EDT -----  Please call the patient regarding result  Unable to process sample, please check if [email or web address] contacted him to provide another kit

## 2022-08-05 ENCOUNTER — OFFICE VISIT (OUTPATIENT)
Dept: URGENT CARE | Facility: CLINIC | Age: 57
End: 2022-08-05
Payer: COMMERCIAL

## 2022-08-05 ENCOUNTER — APPOINTMENT (OUTPATIENT)
Dept: RADIOLOGY | Facility: CLINIC | Age: 57
End: 2022-08-05
Payer: COMMERCIAL

## 2022-08-05 VITALS
SYSTOLIC BLOOD PRESSURE: 132 MMHG | DIASTOLIC BLOOD PRESSURE: 70 MMHG | TEMPERATURE: 98 F | RESPIRATION RATE: 18 BRPM | OXYGEN SATURATION: 96 % | HEART RATE: 89 BPM

## 2022-08-05 DIAGNOSIS — S43.402A SPRAIN OF LEFT SHOULDER, UNSPECIFIED SHOULDER SPRAIN TYPE, INITIAL ENCOUNTER: Primary | ICD-10-CM

## 2022-08-05 DIAGNOSIS — S43.402A SPRAIN OF LEFT SHOULDER, UNSPECIFIED SHOULDER SPRAIN TYPE, INITIAL ENCOUNTER: ICD-10-CM

## 2022-08-05 PROCEDURE — 99214 OFFICE O/P EST MOD 30 MIN: CPT | Performed by: PHYSICIAN ASSISTANT

## 2022-08-05 PROCEDURE — 73030 X-RAY EXAM OF SHOULDER: CPT

## 2022-08-05 RX ORDER — NAPROXEN 500 MG/1
500 TABLET ORAL 2 TIMES DAILY WITH MEALS
Qty: 10 TABLET | Refills: 0 | Status: SHIPPED | OUTPATIENT
Start: 2022-08-05 | End: 2022-09-12

## 2022-08-05 RX ORDER — METHOCARBAMOL 500 MG/1
500 TABLET, FILM COATED ORAL 4 TIMES DAILY
Qty: 20 TABLET | Refills: 0 | Status: SHIPPED | OUTPATIENT
Start: 2022-08-05 | End: 2022-09-12

## 2022-08-05 NOTE — PATIENT INSTRUCTIONS
Left shoulder sprain  Robaxin as directed-may become drowsy   Naprosyn twice daily  Follow up with PCP in 3-5 days  Proceed to  ER if symptoms worsen  Shoulder Immobilizer   WHAT YOU NEED TO KNOW:   A shoulder immobilizer is a device used to keep your arm from moving while your shoulder heals  It is different from a sling because it keeps your arm close to your body  An immobilizer usually has a chest band, an arm band, and a wrist band  Each band will be attached to the chest band  This helps keep your arm from moving up, down, or away from your body  DISCHARGE INSTRUCTIONS:   Call your doctor if:   Your arm, hand, or fingers become swollen, numb, painful, or pale  You have questions about how to use the immobilizer  How to use a shoulder immobilizer:  Your healthcare provider will teach you how to put on your immobilizer  The following are general guidelines to help you remember what your provider teaches you:  Place the chest band where you want it to be  Your device may have a specific part that goes on the side of the injured shoulder  Wrap the chest band around your body  The band should be snug but not too tight  Hook the wrist strap to the chest band  Wrap the wrist band around your wrist  Make sure your hand can move freely  You will need to move your hand and wiggle your fingers to prevent stiffness  Hook the arm band to the chest band, under your armpit  Wrap the short end of the arm band around your arm, right above the elbow  Pull the long end of the arm band behind your back  Secure it to the back of the chest band  Attach the shoulder strap to the front of the chest band  Attach the band on the side of your body that is not injured  The buckle should be on the front of your body  Take the other end of the strap up and over the shoulder that is not injured  Attach it to the chest band behind your back  Your device may have a single strap to attach   It may instead have 2 straps that need to cross your back to form an X     Use the buckle to adjust the shoulder strap into the correct position  Your arm will be at about a 90 degree angle in front of your body  Follow up with your doctor or orthopedist as directed: You may need tests to check how your arm is healing  You may need to have the immobilizer adjusted, or change to a different kind of immobilizer  If your injury has healed, you may not need to keep using the immobilizer  Write down your questions so you remember to ask them during your visits  © Copyright [employer] 2022 Information is for End User's use only and may not be sold, redistributed or otherwise used for commercial purposes  All illustrations and images included in CareNotes® are the copyrighted property of [employer]  or Danni Aragon  The above information is an  only  It is not intended as medical advice for individual conditions or treatments  Talk to your doctor, nurse or pharmacist before following any medical regimen to see if it is safe and effective for you

## 2022-08-05 NOTE — PROGRESS NOTES
[facility]        NAME: Kristal Downs is a 49 y o  male  : 1965    MRN: [id number]  DATE: 2022  TIME: 2:53 PM    Assessment and Plan   Sprain of left shoulder, unspecified shoulder sprain type, initial encounter [S43 402A]  1  Sprain of left shoulder, unspecified shoulder sprain type, initial encounter  XR shoulder 2+ vw left         Patient Instructions     Left shoulder sprain  Robaxin as directed-may become drowsy   Naprosyn twice daily  Follow up with PCP in 3-5 days  Proceed to  ER if symptoms worsen  Chief Complaint     Chief Complaint   Patient presents with    Neck Pain    Shoulder Pain     Pt reports increased pain and inability to lift left shoulder  Pain radiates from neck down shoulder         History of Present Illness       71-year-old male who presents complaining of left shoulder pain  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 he developed pain on the left shoulder that is worse with movement  Denies chest pain, shortness off breath, nausea, vomiting, diaphoreses  Patient states that he usually gets pains/knots and he gets a person that massage stem out but this time the pain is so severe that when he tries to move the arm he has not able to      Review of Systems   Review of Systems   Constitutional: Negative  HENT: Negative  Eyes: Negative  Respiratory: Negative  Negative for apnea, cough, choking, chest tightness, shortness of breath, wheezing and stridor  Cardiovascular: Negative  Negative for chest pain  Musculoskeletal: Positive for arthralgias           Current Medications       Current Outpatient Medications:     docusate sodium (COLACE) 100 mg capsule, Take 1 capsule (100 mg total) by mouth 3 (three) times a day for 14 days, Disp: 42 capsule, Rfl: 0    nicotine (NICODERM CQ) 14 mg/24hr TD 24 hr patch, Place 1 patch on the skin every 24 hours (Patient not taking: Reported on 10/7/2020), Disp: 28 patch, Rfl: 0    Current Allergies     Allergies as of 08/05/2022    (No Known Allergies)            The following portions of the patient's history were reviewed and updated as appropriate: allergies, current medications, past family history, past medical history, past social history, past surgical history and problem list      Past Medical History:   Diagnosis Date    Depression     Diverticulitis of large intestine with abscess without bleeding        Past Surgical History:   Procedure Laterality Date    FL RETROGRADE PYELOGRAM  9/22/2020    TX CYSTOURETHROSCOPY,FULGUR 0 5-2 CM LESN N/A 9/22/2020    Procedure: TRANSURETHRAL RESECTION OF BLADDER TUMOR (TURBT); Surgeon: Dustin Kenney MD;  [facility];  Service: Urology    TX CYSTOURETHROSCOPY,URETER CATHETER Bilateral 9/22/2020    Procedure: Aren Escobedo;  Surgeon: Dustin Kenney MD;  [facility];  Service: Urology    TRANSURETHRAL RESECTION OF PROSTATE N/A 9/22/2020    Procedure: Transurethral incision of prostate;  Surgeon: Dustin Kenney MD;  [facility];  Service: Urology    WISDOM TOOTH EXTRACTION         Family History   Problem Relation Age of Onset    Stroke Mother     Hypertension Father          Medications have been verified  Objective   /70   Pulse 89   Temp 98 °F (36 7 °C)   Resp 18   SpO2 96%        Physical Exam     Physical Exam  Constitutional:       General: He is not in acute distress  Appearance: Normal appearance  He is well-developed  He is not diaphoretic  HENT:      Head: Normocephalic and atraumatic  Cardiovascular:      Rate and Rhythm: Normal rate and regular rhythm  Heart sounds: Normal heart sounds  Pulmonary:      Effort: Pulmonary effort is normal  No respiratory distress  Breath sounds: Normal breath sounds  No wheezing or rales  Chest:      Chest wall: No tenderness     Musculoskeletal:      Right shoulder: Normal       Left shoulder: Tenderness present  No swelling, deformity, effusion, laceration, bony tenderness or crepitus  Decreased range of motion  Normal strength  Normal pulse  Cervical back: Normal range of motion and neck supple  Lymphadenopathy:      Cervical: No cervical adenopathy  Neurological:      Mental Status: He is alert

## 2022-08-08 ENCOUNTER — TELEPHONE (OUTPATIENT)
Dept: FAMILY MEDICINE CLINIC | Facility: CLINIC | Age: 57
End: 2022-08-08

## 2022-08-08 DIAGNOSIS — R19.5 POSITIVE COLORECTAL CANCER SCREENING USING COLOGUARD TEST: Primary | ICD-10-CM

## 2022-08-08 LAB — COLOGUARD RESULT REPORTABLE: POSITIVE

## 2022-08-08 NOTE — TELEPHONE ENCOUNTER
----- Message from Juana Carlos MD sent at 8/8/2022  7:54 AM EDT -----  Please call the patient regarding his abnormal result  Needs to contact GI for colonoscopy  Order in his chart

## 2022-08-31 ENCOUNTER — CONSULT (OUTPATIENT)
Dept: GASTROENTEROLOGY | Facility: CLINIC | Age: 57
End: 2022-08-31
Payer: COMMERCIAL

## 2022-08-31 VITALS
HEART RATE: 69 BPM | HEIGHT: 66 IN | BODY MASS INDEX: 25.71 KG/M2 | SYSTOLIC BLOOD PRESSURE: 100 MMHG | WEIGHT: 160 LBS | DIASTOLIC BLOOD PRESSURE: 61 MMHG

## 2022-08-31 DIAGNOSIS — R17 ELEVATED BILIRUBIN: ICD-10-CM

## 2022-08-31 DIAGNOSIS — R19.5 POSITIVE COLORECTAL CANCER SCREENING USING COLOGUARD TEST: Primary | ICD-10-CM

## 2022-08-31 PROCEDURE — 99203 OFFICE O/P NEW LOW 30 MIN: CPT | Performed by: PHYSICIAN ASSISTANT

## 2022-08-31 NOTE — PATIENT INSTRUCTIONS
Scheduled date of colonoscopy (as of today): Sept 12 2022  Physician performing colonoscopy: Hien  Location of colonoscopy: [facility]  Bowel prep reviewed with patient: Miralax w/ Dulcolax  Instructions reviewed with patient by: Liv Eason   Clearances:  None

## 2022-08-31 NOTE — PROGRESS NOTES
[facility] - Outpatient Consultation  Guicho Michael 49 y o  male MRN: [id number]  Encounter: [id number]          ASSESSMENT AND PLAN:      1  Positive colorectal cancer screening using Cologuard test  Patient with positive Cologuard and will need colonoscopy for evaluation  Prep and procedure were explained to the patient  Patient will do MiraLax Dulcolax prep  - Colonoscopy; Future    2  Elevated bilirubin  Patient with elevated bilirubin and denies any prior elevation or history of liver disease and will repeat hepatic function panel will also include direct bilirubin  If any concerning findings on hepatic function panel may need further evaluation with imaging or blood work  - Hepatic function panel; Future    ______________________________________________________________________    HPI:    This is a 68-year-old male who presents today for evaluation of positive Cologuard testing  Patient has never had colonoscopy and denies any family history of colon cancer  He denies any change in bowel habits blood in stools or dark stools  Denies any upper GI symptoms such as nausea vomiting reflux or difficulty swallowing  Recent blood work was reviewed which revealed an elevated bilirubin of 1 15 but other LFTs were normal and has no history of liver disease in the past   Does not consume alcohol  Denies any abdominal pain  REVIEW OF SYSTEMS:    CONSTITUTIONAL: Denies any fever, chills, rigors, and weight loss  HEENT: No earache or tinnitus  Denies hearing loss or visual disturbances  CARDIOVASCULAR: No chest pain or palpitations  RESPIRATORY: Denies any cough, hemoptysis, shortness of breath or dyspnea on exertion  GASTROINTESTINAL: As noted in the History of Present Illness  GENITOURINARY: No problems with urination  Denies any hematuria or dysuria  NEUROLOGIC: No dizziness or vertigo, denies headaches  MUSCULOSKELETAL: Denies any muscle or joint pain     SKIN: Denies skin rashes or itching  ENDOCRINE: Denies excessive thirst  Denies intolerance to heat or cold  PSYCHOSOCIAL: Denies depression or anxiety  Denies any recent memory loss  Historical Information   Past Medical History:   Diagnosis Date    Depression     Diverticulitis of large intestine with abscess without bleeding      Past Surgical History:   Procedure Laterality Date    FL RETROGRADE PYELOGRAM  9/22/2020    UT CYSTOURETHROSCOPY,FULGUR 0 5-2 CM LESN N/A 9/22/2020    Procedure: TRANSURETHRAL RESECTION OF BLADDER TUMOR (TURBT); Surgeon: Rufino Skinner MD;  [facility];  Service: Urology    UT CYSTOURETHROSCOPY,URETER CATHETER Bilateral 9/22/2020    Procedure: Sirisha Countess;  Surgeon: Rufino Skinner MD;  [facility];  Service: Urology    TRANSURETHRAL RESECTION OF PROSTATE N/A 9/22/2020    Procedure: Transurethral incision of prostate;  Surgeon: Rufino Skinner MD;  [facility];  Service: Urology    WISDOM TOOTH EXTRACTION       Social History   Social History     Substance and Sexual Activity   Alcohol Use No     Social History     Substance and Sexual Activity   Drug Use No    Comment: last used crack/cocaine in 2000     Social History     Tobacco Use   Smoking Status Current Every Day Smoker    Packs/day: 1 00    Years: 45 00    Pack years: 45 00    Types: Cigarettes   Smokeless Tobacco Never Used     Family History   Problem Relation Age of Onset    Stroke Mother     Hypertension Father        Meds/Allergies       Current Outpatient Medications:     methocarbamol (ROBAXIN) 500 mg tablet    naproxen (Naprosyn) 500 mg tablet    No Known Allergies        Objective     Blood pressure 100/61, pulse 69, height 5' 6" (1 676 m), weight 72 6 kg (160 lb)  Body mass index is 25 82 kg/m²          PHYSICAL EXAM:      General Appearance:   Alert, cooperative, no distress   HEENT:   Normocephalic, atraumatic, anicteric      Neck:  Supple, symmetrical, trachea midline   Lungs:   Clear to auscultation bilaterally; no rales, rhonchi or wheezing; respirations unlabored    Heart[de-identified]   Regular rate and rhythm; no murmur, rub, or gallop  Abdomen:   Soft, non-tender, non-distended; normal bowel sounds; no masses, no organomegaly    Genitalia:   Deferred    Rectal:   Deferred    Extremities:  No cyanosis, clubbing or edema    Pulses:  2+ and symmetric    Skin:  No jaundice, rashes, or lesions    Lymph nodes:  No palpable cervical lymphadenopathy        Lab Results:   No visits with results within 1 Day(s) from this visit  Latest known visit with results is:   Appointment on 05/20/2022   Component Date Value    Sodium 05/20/2022 139     Potassium 05/20/2022 4 5     Chloride 05/20/2022 106     CO2 05/20/2022 30     ANION GAP 05/20/2022 3 (A)    BUN 05/20/2022 15     Creatinine 05/20/2022 0 87     Glucose, Fasting 05/20/2022 109 (A)    Calcium 05/20/2022 9 9     AST 05/20/2022 17     ALT 05/20/2022 29     Alkaline Phosphatase 05/20/2022 80     Total Protein 05/20/2022 6 9     Albumin 05/20/2022 3 9     Total Bilirubin 05/20/2022 1 15 (A)    eGFR 05/20/2022 97     Cholesterol 05/20/2022 203 (A)    Triglycerides 05/20/2022 98     HDL, Direct 05/20/2022 53     LDL Calculated 05/20/2022 130 (A)    Non-HDL-Chol (CHOL-HDL) 05/20/2022 150     PSA 05/20/2022 0 5          Radiology Results:   XR shoulder 2+ vw left    Result Date: 8/5/2022  Narrative: LEFT SHOULDER INDICATION:   S43 402A: Unspecified sprain of left shoulder joint, initial encounter  Increased left shoulder pain and inability to lift the left shoulder  Pain radiates from neck down the shoulder  No reported trauma  COMPARISON:  None VIEWS:  XR SHOULDER 2+ VW LEFT FINDINGS: Bones are intact  Alignment and joint spaces preserved  Bone mineralization is within normal limits  Mild acromioclavicular osteoarthritis  Soft tissues are within normal limits  Visualized lungs are clear  Impression: No acute osseous abnormality    Workstation performed: [id number]

## 2022-09-06 ENCOUNTER — APPOINTMENT (OUTPATIENT)
Dept: LAB | Facility: AMBULARY SURGERY CENTER | Age: 57
End: 2022-09-06
Payer: COMMERCIAL

## 2022-09-06 DIAGNOSIS — R17 ELEVATED BILIRUBIN: ICD-10-CM

## 2022-09-06 LAB
ALBUMIN SERPL BCP-MCNC: 3.6 G/DL (ref 3.5–5)
ALP SERPL-CCNC: 86 U/L (ref 46–116)
ALT SERPL W P-5'-P-CCNC: 26 U/L (ref 12–78)
AST SERPL W P-5'-P-CCNC: 14 U/L (ref 5–45)
BILIRUB DIRECT SERPL-MCNC: 0.12 MG/DL (ref 0–0.2)
BILIRUB SERPL-MCNC: 0.59 MG/DL (ref 0.2–1)
PROT SERPL-MCNC: 6.6 G/DL (ref 6.4–8.4)

## 2022-09-06 PROCEDURE — 80076 HEPATIC FUNCTION PANEL: CPT

## 2022-09-06 PROCEDURE — 36415 COLL VENOUS BLD VENIPUNCTURE: CPT

## 2022-09-07 ENCOUNTER — TELEPHONE (OUTPATIENT)
Dept: GASTROENTEROLOGY | Facility: CLINIC | Age: 57
End: 2022-09-07

## 2022-09-07 NOTE — TELEPHONE ENCOUNTER
Spoke to pt  confirming pt's colonoscopy scheduled on 9/12/22 at [facility]  Informed [facility] would be calling 1-2 days prior with the arrival time  Informed pt of clear liquid diet day prior as well as the bowel cleansing preparation  Informed pt would need a  the day of the procedure due to being under sedation  I asked pt to please call back if has any questions or has not received instructions  Advised pt to contact insurance if has any questions regarding the coverage of procedures

## 2022-09-09 ENCOUNTER — ANESTHESIA EVENT (OUTPATIENT)
Dept: GASTROENTEROLOGY | Facility: AMBULATORY SURGERY CENTER | Age: 57
End: 2022-09-09

## 2022-09-12 ENCOUNTER — ANESTHESIA (OUTPATIENT)
Dept: GASTROENTEROLOGY | Facility: AMBULATORY SURGERY CENTER | Age: 57
End: 2022-09-12

## 2022-09-12 ENCOUNTER — HOSPITAL ENCOUNTER (OUTPATIENT)
Dept: GASTROENTEROLOGY | Facility: AMBULATORY SURGERY CENTER | Age: 57
Discharge: HOME | End: 2022-09-12
Payer: COMMERCIAL

## 2022-09-12 VITALS
DIASTOLIC BLOOD PRESSURE: 70 MMHG | TEMPERATURE: 97.3 F | HEIGHT: 64 IN | OXYGEN SATURATION: 98 % | BODY MASS INDEX: 28.17 KG/M2 | WEIGHT: 165 LBS | SYSTOLIC BLOOD PRESSURE: 105 MMHG | RESPIRATION RATE: 18 BRPM | HEART RATE: 61 BPM

## 2022-09-12 DIAGNOSIS — R19.5 POSITIVE COLORECTAL CANCER SCREENING USING COLOGUARD TEST: ICD-10-CM

## 2022-09-12 PROCEDURE — 45380 COLONOSCOPY AND BIOPSY: CPT | Performed by: INTERNAL MEDICINE

## 2022-09-12 PROCEDURE — 45385 COLONOSCOPY W/LESION REMOVAL: CPT | Performed by: INTERNAL MEDICINE

## 2022-09-12 PROCEDURE — 88305 TISSUE EXAM BY PATHOLOGIST: CPT | Performed by: PATHOLOGY

## 2022-09-12 RX ORDER — PROPOFOL 10 MG/ML
INJECTION, EMULSION INTRAVENOUS AS NEEDED
Status: DISCONTINUED | OUTPATIENT
Start: 2022-09-12 | End: 2022-09-12

## 2022-09-12 RX ORDER — SODIUM CHLORIDE 9 MG/ML
INJECTION, SOLUTION INTRAVENOUS CONTINUOUS PRN
Status: DISCONTINUED | OUTPATIENT
Start: 2022-09-12 | End: 2022-09-12

## 2022-09-12 RX ORDER — SODIUM CHLORIDE 9 MG/ML
20 INJECTION, SOLUTION INTRAVENOUS CONTINUOUS
Status: DISCONTINUED | OUTPATIENT
Start: 2022-09-12 | End: 2022-09-16 | Stop reason: HOSPADM

## 2022-09-12 RX ORDER — LIDOCAINE HYDROCHLORIDE 20 MG/ML
INJECTION, SOLUTION EPIDURAL; INFILTRATION; INTRACAUDAL; PERINEURAL AS NEEDED
Status: DISCONTINUED | OUTPATIENT
Start: 2022-09-12 | End: 2022-09-12

## 2022-09-12 RX ADMIN — PROPOFOL 30 MG: 10 INJECTION, EMULSION INTRAVENOUS at 10:11

## 2022-09-12 RX ADMIN — PROPOFOL 40 MG: 10 INJECTION, EMULSION INTRAVENOUS at 10:12

## 2022-09-12 RX ADMIN — PROPOFOL 30 MG: 10 INJECTION, EMULSION INTRAVENOUS at 10:14

## 2022-09-12 RX ADMIN — PROPOFOL 30 MG: 10 INJECTION, EMULSION INTRAVENOUS at 10:09

## 2022-09-12 RX ADMIN — PROPOFOL 30 MG: 10 INJECTION, EMULSION INTRAVENOUS at 10:23

## 2022-09-12 RX ADMIN — PROPOFOL 100 MG: 10 INJECTION, EMULSION INTRAVENOUS at 10:07

## 2022-09-12 RX ADMIN — PROPOFOL 30 MG: 10 INJECTION, EMULSION INTRAVENOUS at 10:15

## 2022-09-12 RX ADMIN — PROPOFOL 30 MG: 10 INJECTION, EMULSION INTRAVENOUS at 10:18

## 2022-09-12 RX ADMIN — PROPOFOL 30 MG: 10 INJECTION, EMULSION INTRAVENOUS at 10:16

## 2022-09-12 RX ADMIN — PROPOFOL 30 MG: 10 INJECTION, EMULSION INTRAVENOUS at 10:20

## 2022-09-12 RX ADMIN — PROPOFOL 40 MG: 10 INJECTION, EMULSION INTRAVENOUS at 10:29

## 2022-09-12 RX ADMIN — SODIUM CHLORIDE: 9 INJECTION, SOLUTION INTRAVENOUS at 09:56

## 2022-09-12 RX ADMIN — LIDOCAINE HYDROCHLORIDE 100 MG: 20 INJECTION, SOLUTION EPIDURAL; INFILTRATION; INTRACAUDAL; PERINEURAL at 10:07

## 2022-09-12 RX ADMIN — PROPOFOL 20 MG: 10 INJECTION, EMULSION INTRAVENOUS at 10:26

## 2022-09-12 NOTE — ANESTHESIA POSTPROCEDURE EVALUATION
Post-Op Assessment Note    CV Status:  Stable  Pain Score: 0    Pain management: adequate     Mental Status:  Arousable   Hydration Status:  Euvolemic   PONV Controlled:  Controlled   Airway Patency:  Patent      Post Op Vitals Reviewed: Yes      Staff: Anesthesiologist, CRNA         No complications documented      BP   100/62   Temp     Pulse 85   Resp 12   SpO2 96

## 2022-09-12 NOTE — ANESTHESIA PREPROCEDURE EVALUATION
Procedure:  COLONOSCOPY    Relevant Problems   ANESTHESIA (within normal limits)      CARDIO (within normal limits)      ENDO (within normal limits)      PULMONARY   (+) Smoking        Physical Exam    Airway    Mallampati score: I  TM Distance: >3 FB  Neck ROM: full     Dental       Cardiovascular  Rhythm: regular, Rate: normal,     Pulmonary  Breath sounds clear to auscultation,     Other Findings        Anesthesia Plan  ASA Score- 2     Anesthesia Type- IV sedation with anesthesia with ASA Monitors  Additional Monitors:   Airway Plan:           Plan Factors-Exercise tolerance (METS): >4 METS  Chart reviewed  Existing labs reviewed  Patient summary reviewed  Patient is a current smoker  Patient smoked on day of surgery  Obstructive sleep apnea risk education given perioperatively  Induction-     Postoperative Plan-     Informed Consent- Anesthetic plan and risks discussed with patient  I personally reviewed this patient with the CRNA  Discussed and agreed on the Anesthesia Plan with the CRNA  Honorio Nuñez

## 2022-09-13 ENCOUNTER — VBI (OUTPATIENT)
Dept: ADMINISTRATIVE | Facility: OTHER | Age: 57
End: 2022-09-13

## 2022-10-13 ENCOUNTER — APPOINTMENT (OUTPATIENT)
Dept: LAB | Facility: AMBULARY SURGERY CENTER | Age: 57
End: 2022-10-13
Payer: COMMERCIAL

## 2022-10-13 DIAGNOSIS — R73.01 ELEVATED FASTING BLOOD SUGAR: ICD-10-CM

## 2022-10-13 DIAGNOSIS — E78.2 MIXED HYPERLIPIDEMIA: ICD-10-CM

## 2022-10-13 LAB
ALBUMIN SERPL BCP-MCNC: 3.6 G/DL (ref 3.5–5)
ALP SERPL-CCNC: 91 U/L (ref 46–116)
ALT SERPL W P-5'-P-CCNC: 26 U/L (ref 12–78)
ANION GAP SERPL CALCULATED.3IONS-SCNC: 4 MMOL/L (ref 4–13)
AST SERPL W P-5'-P-CCNC: 12 U/L (ref 5–45)
BILIRUB SERPL-MCNC: 1.09 MG/DL (ref 0.2–1)
BUN SERPL-MCNC: 15 MG/DL (ref 5–25)
CALCIUM SERPL-MCNC: 9.5 MG/DL (ref 8.3–10.1)
CHLORIDE SERPL-SCNC: 104 MMOL/L (ref 96–108)
CHOLEST SERPL-MCNC: 179 MG/DL
CO2 SERPL-SCNC: 30 MMOL/L (ref 21–32)
CREAT SERPL-MCNC: 0.86 MG/DL (ref 0.6–1.3)
EST. AVERAGE GLUCOSE BLD GHB EST-MCNC: 123 MG/DL
GFR SERPL CREATININE-BSD FRML MDRD: 98 ML/MIN/1.73SQ M
GLUCOSE P FAST SERPL-MCNC: 108 MG/DL (ref 65–99)
HBA1C MFR BLD: 5.9 %
HDLC SERPL-MCNC: 54 MG/DL
LDLC SERPL CALC-MCNC: 110 MG/DL (ref 0–100)
NONHDLC SERPL-MCNC: 125 MG/DL
POTASSIUM SERPL-SCNC: 4.3 MMOL/L (ref 3.5–5.3)
PROT SERPL-MCNC: 6.7 G/DL (ref 6.4–8.4)
SODIUM SERPL-SCNC: 138 MMOL/L (ref 135–147)
TRIGL SERPL-MCNC: 73 MG/DL

## 2022-10-13 PROCEDURE — 80053 COMPREHEN METABOLIC PANEL: CPT

## 2022-10-13 PROCEDURE — 36415 COLL VENOUS BLD VENIPUNCTURE: CPT

## 2022-10-13 PROCEDURE — 83036 HEMOGLOBIN GLYCOSYLATED A1C: CPT

## 2022-10-13 PROCEDURE — 80061 LIPID PANEL: CPT

## 2023-06-02 ENCOUNTER — RA CDI HCC (OUTPATIENT)
Dept: OTHER | Facility: HOSPITAL | Age: 58
End: 2023-06-02

## 2023-06-09 ENCOUNTER — OFFICE VISIT (OUTPATIENT)
Dept: FAMILY MEDICINE CLINIC | Facility: CLINIC | Age: 58
End: 2023-06-09
Payer: COMMERCIAL

## 2023-06-09 VITALS
DIASTOLIC BLOOD PRESSURE: 76 MMHG | HEART RATE: 68 BPM | BODY MASS INDEX: 28.51 KG/M2 | WEIGHT: 167 LBS | HEIGHT: 64 IN | OXYGEN SATURATION: 98 % | SYSTOLIC BLOOD PRESSURE: 118 MMHG

## 2023-06-09 DIAGNOSIS — F17.200 SMOKING: ICD-10-CM

## 2023-06-09 DIAGNOSIS — R73.03 PREDIABETES: ICD-10-CM

## 2023-06-09 DIAGNOSIS — Z00.00 ANNUAL PHYSICAL EXAM: ICD-10-CM

## 2023-06-09 DIAGNOSIS — E78.5 DYSLIPIDEMIA: ICD-10-CM

## 2023-06-09 DIAGNOSIS — Z12.5 SCREENING FOR PROSTATE CANCER: ICD-10-CM

## 2023-06-09 DIAGNOSIS — Z00.00 PREVENTATIVE HEALTH CARE: Primary | ICD-10-CM

## 2023-06-09 PROCEDURE — 99396 PREV VISIT EST AGE 40-64: CPT | Performed by: FAMILY MEDICINE

## 2023-06-09 NOTE — ASSESSMENT & PLAN NOTE
Patient is due for metabolic labs  Up-to-date on colorectal cancer screening  PSA ordered for prostate cancer

## 2023-06-09 NOTE — PROGRESS NOTES
[facility] [place]    NAME: Ifeoma Murphy  AGE: 56 y o  SEX: male  : 1965     DATE: 2023     Assessment and Plan:     Problem List Items Addressed This Visit        Other    Smoking     Patient advised to quit smoking  He has been able to do it twice in the past by quitting cold turkey  Patient is going to try same  Follow-up if unable to quit  Recommended CT for lung cancer screening  Relevant Orders    CT lung screening program    Preventative health care - Primary     Patient is due for metabolic labs  Up-to-date on colorectal cancer screening  PSA ordered for prostate cancer  Prediabetes     Patient encouraged to continue with low-carb diet  Check A1c to rule out progression to diabetes  Relevant Orders    Comprehensive metabolic panel    Hemoglobin A1C    Dyslipidemia     LDL borderline  Encouraged to continue with low-fat diet  Recommended metabolic labs  Relevant Orders    Lipid panel   Other Visit Diagnoses     Screening for prostate cancer        Relevant Orders    PSA, Total Screen          Immunizations and preventive care screenings were discussed with patient today  Appropriate education was printed on patient's after visit summary  Discussed risks and benefits of prostate cancer screening  We discussed the controversial history of PSA screening for prostate cancer in the [place] as well as the risk of over detection and over treatment of prostate cancer by way of PSA screening  The patient understands that PSA blood testing is an imperfect way to screen for prostate cancer and that elevated PSA levels in the blood may also be caused by infection, inflammation, prostatic trauma or manipulation, urological procedures, or by benign prostatic enlargement      The role of the digital rectal examination in prostate cancer screening was also discussed and I discussed with him that there is large interobserver variability in the findings of digital rectal examination  Counseling:  Dental Health: discussed importance of regular tooth brushing, flossing, and dental visits  · Exercise: the importance of regular exercise/physical activity was discussed  Recommend exercise 3-5 times per week for at least 30 minutes  BMI Counseling: Body mass index is 28 67 kg/m²  The BMI is above normal  Nutrition recommendations include encouraging healthy choices of fruits and vegetables  Exercise recommendations include moderate physical activity 150 minutes/week  Rationale for BMI follow-up plan is due to patient being overweight or obese  Depression Screening and Follow-up Plan: Patient was screened for depression during today's encounter  They screened negative with a PHQ-2 score of 0  Tobacco Cessation Counseling: Tobacco cessation counseling was provided  The patient is sincerely urged to quit consumption of tobacco  He is ready to quit tobacco          No follow-ups on file  Chief Complaint:     Chief Complaint   Patient presents with   • Physical Exam      History of Present Illness:     Adult Annual Physical   Patient here for a comprehensive physical exam  The patient reports no problems  Diet and Physical Activity  · Diet/Nutrition: well balanced diet  · Exercise: walking  Depression Screening  PHQ-2/9 Depression Screening    Little interest or pleasure in doing things: 0 - not at all  Feeling down, depressed, or hopeless: 0 - not at all  PHQ-2 Score: 0  PHQ-2 Interpretation: Negative depression screen       General Health  · Sleep: sleeps well  · Hearing: normal - bilateral   · Vision: no vision problems  · Dental: regular dental visits   Health  · Symptoms include: none     Review of Systems:     Review of Systems   Constitutional: Negative  Respiratory: Negative  Negative for cough, chest tightness and wheezing  Cardiovascular: Negative  Genitourinary: Negative  Past Medical History:     Past Medical History:   Diagnosis Date   • Depression    • Diverticulitis of large intestine with abscess without bleeding       Past Surgical History:     Past Surgical History:   Procedure Laterality Date   • FL RETROGRADE PYELOGRAM  9/22/2020   • [place] CYSTO BLADDER W/URETERAL CATHETERIZATION Bilateral 9/22/2020    Procedure: CYSTOSCOPY RETROGRADE PYELOGRAM;  Surgeon: Etienne Lindquist MD;  [facility];  Service: Urology   • [place] CYSTO W/REMOVAL OF TUMORS SMALL N/A 9/22/2020    Procedure: TRANSURETHRAL RESECTION OF BLADDER TUMOR (TURBT);   Surgeon: Etienne Lindquist MD;  [facility];  Service: Urology   • TRANSURETHRAL RESECTION OF PROSTATE N/A 9/22/2020    Procedure: Transurethral incision of prostate;  Surgeon: Etienne Lindquist MD;  [facility];  Service: Urology   • WISDOM TOOTH EXTRACTION        Family History:     Family History   Problem Relation Age of Onset   • Stroke Mother    • Hypertension Father       Social History:     Social History     Socioeconomic History   • Marital status: /Civil Union     Spouse name: None   • Number of children: None   • Years of education: None   • Highest education level: None   Occupational History   • None   Tobacco Use   • Smoking status: Every Day     Packs/day: 1 00     Years: 45 00     Total pack years: 45 00     Types: Cigarettes   • Smokeless tobacco: Never   Vaping Use   • Vaping Use: Never used   Substance and Sexual Activity   • Alcohol use: No   • Drug use: No     Comment: last used crack/cocaine in 2000   • Sexual activity: None   Other Topics Concern   • None   Social History Narrative   • None     Social Determinants of Health     Financial Resource Strain: Not on file   Food Insecurity: Not on file   Transportation Needs: Not on file   Physical Activity: Not on file   Stress: Not on file   Social Connections: Not on file   Intimate Partner Violence: Not on file "  Housing Stability: Not on file      Current Medications:     No current outpatient medications on file  No current facility-administered medications for this visit  Allergies:     No Known Allergies   Physical Exam:     /76   Pulse 68   Ht 5' 4\" (1 626 m)   Wt 75 8 kg (167 lb)   SpO2 98%   BMI 28 67 kg/m²     Physical Exam  Constitutional:       Appearance: Normal appearance  HENT:      Right Ear: Tympanic membrane normal  There is impacted cerumen  Left Ear: Tympanic membrane normal       Mouth/Throat:      Pharynx: No posterior oropharyngeal erythema  Eyes:      Pupils: Pupils are equal, round, and reactive to light  Cardiovascular:      Heart sounds: Normal heart sounds  Pulmonary:      Breath sounds: Normal breath sounds  Abdominal:      Palpations: Abdomen is soft  Musculoskeletal:      Right lower leg: No edema  Left lower leg: No edema  Neurological:      Mental Status: He is oriented to person, place, and time     Psychiatric:         Mood and Affect: Mood normal           Brie Carlson MD  [facility]  "

## 2023-06-09 NOTE — ASSESSMENT & PLAN NOTE
Patient advised to quit smoking  He has been able to do it twice in the past by quitting cold turkey  Patient is going to try same  Follow-up if unable to quit  Recommended CT for lung cancer screening

## 2023-06-21 ENCOUNTER — APPOINTMENT (OUTPATIENT)
Dept: LAB | Facility: AMBULARY SURGERY CENTER | Age: 58
End: 2023-06-21
Payer: COMMERCIAL

## 2023-06-21 DIAGNOSIS — Z12.5 SCREENING FOR PROSTATE CANCER: ICD-10-CM

## 2023-06-21 DIAGNOSIS — R73.03 PREDIABETES: ICD-10-CM

## 2023-06-21 DIAGNOSIS — E78.5 DYSLIPIDEMIA: ICD-10-CM

## 2023-06-21 LAB
ALBUMIN SERPL BCP-MCNC: 3.8 G/DL (ref 3.5–5)
ALP SERPL-CCNC: 96 U/L (ref 46–116)
ALT SERPL W P-5'-P-CCNC: 26 U/L (ref 12–78)
ANION GAP SERPL CALCULATED.3IONS-SCNC: 3 MMOL/L
AST SERPL W P-5'-P-CCNC: 15 U/L (ref 5–45)
BILIRUB SERPL-MCNC: 0.7 MG/DL (ref 0.2–1)
BUN SERPL-MCNC: 15 MG/DL (ref 5–25)
CALCIUM SERPL-MCNC: 9.5 MG/DL (ref 8.3–10.1)
CHLORIDE SERPL-SCNC: 107 MMOL/L (ref 96–108)
CHOLEST SERPL-MCNC: 173 MG/DL
CO2 SERPL-SCNC: 29 MMOL/L (ref 21–32)
CREAT SERPL-MCNC: 0.94 MG/DL (ref 0.6–1.3)
EST. AVERAGE GLUCOSE BLD GHB EST-MCNC: 117 MG/DL
GFR SERPL CREATININE-BSD FRML MDRD: 90 ML/MIN/1.73SQ M
GLUCOSE P FAST SERPL-MCNC: 100 MG/DL (ref 65–99)
HBA1C MFR BLD: 5.7 %
HDLC SERPL-MCNC: 38 MG/DL
LDLC SERPL CALC-MCNC: 119 MG/DL (ref 0–100)
NONHDLC SERPL-MCNC: 135 MG/DL
POTASSIUM SERPL-SCNC: 3.9 MMOL/L (ref 3.5–5.3)
PROT SERPL-MCNC: 7.2 G/DL (ref 6.4–8.4)
PSA SERPL-MCNC: 0.28 NG/ML (ref 0–4)
SODIUM SERPL-SCNC: 139 MMOL/L (ref 135–147)
TRIGL SERPL-MCNC: 82 MG/DL

## 2023-06-21 PROCEDURE — 36415 COLL VENOUS BLD VENIPUNCTURE: CPT

## 2023-06-21 PROCEDURE — 83036 HEMOGLOBIN GLYCOSYLATED A1C: CPT

## 2023-06-21 PROCEDURE — 80061 LIPID PANEL: CPT

## 2023-06-21 PROCEDURE — G0103 PSA SCREENING: HCPCS

## 2023-06-21 PROCEDURE — 80053 COMPREHEN METABOLIC PANEL: CPT

## 2023-06-22 ENCOUNTER — TELEPHONE (OUTPATIENT)
Dept: FAMILY MEDICINE CLINIC | Facility: CLINIC | Age: 58
End: 2023-06-22

## 2023-06-22 NOTE — TELEPHONE ENCOUNTER
----- Message from Bry Chadwick MD sent at 6/21/2023  5:19 PM EDT -----  Please call the patient regarding his abnormal result  Cholesterol continues to be borderline high  Please advise low-fat diet  A1c 5 7, prediabetes  Please advise low-carb diet/regular exercise  Continue monitoring annually

## 2023-06-27 ENCOUNTER — HOSPITAL ENCOUNTER (OUTPATIENT)
Dept: CT IMAGING | Facility: HOSPITAL | Age: 58
Discharge: HOME | End: 2023-06-27
Payer: COMMERCIAL

## 2023-06-27 DIAGNOSIS — F17.200 SMOKING: ICD-10-CM

## 2023-06-27 PROCEDURE — 71271 CT THORAX LUNG CANCER SCR C-: CPT

## 2023-07-03 ENCOUNTER — TELEPHONE (OUTPATIENT)
Dept: FAMILY MEDICINE CLINIC | Facility: CLINIC | Age: 58
End: 2023-07-03

## 2023-07-03 NOTE — TELEPHONE ENCOUNTER
----- Message from Lana Pepper MD sent at 7/3/2023  1:22 PM EDT -----  Please call the patient regarding his abnormal result. smoking-related chronic bronchitis, no pulmonary nodules. Has been advised to quit smoking.

## 2023-10-09 ENCOUNTER — OFFICE VISIT (OUTPATIENT)
Dept: URGENT CARE | Facility: CLINIC | Age: 58
End: 2023-10-09
Payer: COMMERCIAL

## 2023-10-09 VITALS
WEIGHT: 167 LBS | SYSTOLIC BLOOD PRESSURE: 116 MMHG | HEART RATE: 67 BPM | RESPIRATION RATE: 16 BRPM | DIASTOLIC BLOOD PRESSURE: 74 MMHG | HEIGHT: 64 IN | OXYGEN SATURATION: 98 % | BODY MASS INDEX: 28.51 KG/M2 | TEMPERATURE: 97.8 F

## 2023-10-09 DIAGNOSIS — H60.501 ACUTE OTITIS EXTERNA OF RIGHT EAR, UNSPECIFIED TYPE: Primary | ICD-10-CM

## 2023-10-09 PROCEDURE — 99213 OFFICE O/P EST LOW 20 MIN: CPT | Performed by: NURSE PRACTITIONER

## 2023-10-09 RX ORDER — AZITHROMYCIN 250 MG/1
TABLET, FILM COATED ORAL
Qty: 6 TABLET | Refills: 0 | Status: SHIPPED | OUTPATIENT
Start: 2023-10-09 | End: 2023-10-13

## 2023-10-09 RX ORDER — NEOMYCIN SULFATE, POLYMYXIN B SULFATE, HYDROCORTISONE 3.5; 10000; 1 MG/ML; [USP'U]/ML; MG/ML
4 SOLUTION/ DROPS AURICULAR (OTIC) EVERY 6 HOURS SCHEDULED
Qty: 10 ML | Refills: 0 | Status: SHIPPED | OUTPATIENT
Start: 2023-10-09 | End: 2023-10-14

## 2023-10-09 NOTE — PROGRESS NOTES
[facility]        NAME: Nancy Martínez is a 56 y.o. male  : 1965    MRN: [id number]  DATE: 2023  TIME: 1:21 PM    Assessment and Plan   Acute otitis externa of right ear, unspecified type [H60.501]  1. Acute otitis externa of right ear, unspecified type  azithromycin (ZITHROMAX) 250 mg tablet    neomycin-polymyxin-hydrocortisone (CORTISPORIN) 1 % SOLN            Patient Instructions       Follow up with PCP in 3-5 days. Proceed to  ER if symptoms worsen. Chief Complaint     Chief Complaint   Patient presents with   • Earache     Pt reports having an earache  in right ear for about 4 days         History of Present Illness       Patient is a 56year old male presenting with right ear pain for the past 4 days. Started after he took a shower and got on his motorcycle. He states it feels like there is water in his ear. Denies URI symptoms. He used debrox without relief. Earache   Pertinent negatives include no coughing, ear discharge, headaches, rhinorrhea or sore throat. Review of Systems   Review of Systems   Constitutional: Negative for activity change, chills and fever. HENT: Positive for ear pain. Negative for congestion, ear discharge, postnasal drip, rhinorrhea, sinus pain and sore throat. Respiratory: Negative for cough. Neurological: Negative for headaches.          Current Medications       Current Outpatient Medications:   •  azithromycin (ZITHROMAX) 250 mg tablet, Take 2 tablets today then 1 tablet daily x 4 days, Disp: 6 tablet, Rfl: 0  •  neomycin-polymyxin-hydrocortisone (CORTISPORIN) 1 % SOLN, Administer 4 drops to the right ear every 6 (six) hours for 5 days, Disp: 10 mL, Rfl: 0    Current Allergies     Allergies as of 10/09/2023   • (No Known Allergies)            The following portions of the patient's history were reviewed and updated as appropriate: allergies, current medications, past family history, past medical history, past social history, past surgical history and problem list.     Past Medical History:   Diagnosis Date   • Depression    • Diverticulitis of large intestine with abscess without bleeding        Past Surgical History:   Procedure Laterality Date   • FL RETROGRADE PYELOGRAM  9/22/2020   • ME CYSTO BLADDER W/URETERAL CATHETERIZATION Bilateral 9/22/2020    Procedure: CYSTOSCOPY RETROGRADE PYELOGRAM;  Surgeon: Justice Rios MD;  [facility];  Service: Urology   • ME CYSTO W/REMOVAL OF TUMORS SMALL N/A 9/22/2020    Procedure: TRANSURETHRAL RESECTION OF BLADDER TUMOR (TURBT); Surgeon: Justice Rios MD;  [facility];  Service: Urology   • TRANSURETHRAL RESECTION OF PROSTATE N/A 9/22/2020    Procedure: Transurethral incision of prostate;  Surgeon: Justice Rios MD;  [facility];  Service: Urology   • WISDOM TOOTH EXTRACTION         Family History   Problem Relation Age of Onset   • Stroke Mother    • Hypertension Father          Medications have been verified. Objective   /74   Pulse 67   Temp 97.8 °F (36.6 °C)   Resp 16   Ht 5' 4" (1.626 m)   Wt 75.8 kg (167 lb)   SpO2 98%   BMI 28.67 kg/m²        Physical Exam     Physical Exam  Vitals reviewed. Constitutional:       General: He is awake. He is not in acute distress. Appearance: Normal appearance. He is normal weight. HENT:      Head: Normocephalic. Left Ear: Hearing, tympanic membrane, ear canal and external ear normal.      Ears:      Comments: Right moderate canal swelling with yellow discharge. TM intact with yellow serous effusion. Cardiovascular:      Rate and Rhythm: Normal rate. Pulmonary:      Effort: Pulmonary effort is normal.   Skin:     General: Skin is warm and moist.   Neurological:      General: No focal deficit present. Mental Status: He is alert and oriented to person, place, and time. Psychiatric:         Behavior: Behavior is cooperative.

## 2023-10-10 ENCOUNTER — TELEPHONE (OUTPATIENT)
Dept: GASTROENTEROLOGY | Facility: CLINIC | Age: 58
End: 2023-10-10

## 2023-10-10 NOTE — TELEPHONE ENCOUNTER
I spoke with wife and gave her the number so that Estonian Jarrett can call back and schedule his 1 yr colon recall .  sb

## 2023-10-12 ENCOUNTER — TELEPHONE (OUTPATIENT)
Age: 58
End: 2023-10-12

## 2023-10-12 ENCOUNTER — PREP FOR PROCEDURE (OUTPATIENT)
Age: 58
End: 2023-10-12

## 2023-10-12 DIAGNOSIS — Z86.010 HX OF COLONIC POLYPS: Primary | ICD-10-CM

## 2023-10-12 NOTE — TELEPHONE ENCOUNTER
Scheduled date of colonoscopy (as of today): 11/22/2023  Physician performing colonoscopy: Kimberly Mcmillan  Location of colonoscopy: [facility]  Bowel prep reviewed with patient: MIRALAX/DULCOLAX  Instructions reviewed with patient by: [facility] via telephone. Procedure directions sent via [employer].   Clearances:  n/a

## 2023-10-12 NOTE — TELEPHONE ENCOUNTER
10/12/23  Screened by: Lady Jamar MA    Referring Provider 1 yr repeat     Pre- Screening: There is no height or weight on file to calculate BMI. Has patient been referred for a routine screening Colonoscopy? yes  Is the patient between 43-73 years old? yes      Previous Colonoscopy yes   If yes:    Date: 9/12/2022    Facility: [facility]    Reason: POSITIVE COLOGUARD      SCHEDULING STAFF: If the patient is between 39yrs-51yrs, please advise patient to confirm benefits/coverage with their insurance company for a routine screening colonoscopy, some insurance carriers will only cover at [address] [place] [address] or older. If the patient is over 66years old, please schedule an office visit. Does the patient want to see a Gastroenterologist prior to their procedure OR are they having any GI symptoms? no    Has the patient been hospitalized or had abdominal surgery in the past 6 months? no    Does the patient use supplemental oxygen? no    Does the patient take Coumadin, Lovenox, Plavix, Elliquis, Xarelto, or other blood thinning medication? no    Has the patient had a stroke, cardiac event, or stent placed in the past year? no    SCHEDULING STAFF: If patient answers NO to above questions, then schedule procedure. If patient answers YES to above questions, then schedule office appointment. If patient is between 45yrs - 49yrs, please advise patient that we will have to confirm benefits & coverage with their insurance company for a routine screening colonoscopy.

## 2023-11-08 ENCOUNTER — ANESTHESIA EVENT (OUTPATIENT)
Dept: ANESTHESIOLOGY | Facility: AMBULATORY SURGERY CENTER | Age: 58
End: 2023-11-08

## 2023-11-08 ENCOUNTER — ANESTHESIA (OUTPATIENT)
Dept: ANESTHESIOLOGY | Facility: AMBULATORY SURGERY CENTER | Age: 58
End: 2023-11-08

## 2023-11-22 ENCOUNTER — ANESTHESIA EVENT (OUTPATIENT)
Dept: GASTROENTEROLOGY | Facility: AMBULATORY SURGERY CENTER | Age: 58
End: 2023-11-22

## 2023-11-22 ENCOUNTER — HOSPITAL ENCOUNTER (OUTPATIENT)
Dept: GASTROENTEROLOGY | Facility: AMBULATORY SURGERY CENTER | Age: 58
Discharge: HOME | End: 2023-11-22
Payer: COMMERCIAL

## 2023-11-22 ENCOUNTER — ANESTHESIA (OUTPATIENT)
Dept: GASTROENTEROLOGY | Facility: AMBULATORY SURGERY CENTER | Age: 58
End: 2023-11-22

## 2023-11-22 VITALS
HEIGHT: 64 IN | RESPIRATION RATE: 18 BRPM | DIASTOLIC BLOOD PRESSURE: 74 MMHG | SYSTOLIC BLOOD PRESSURE: 104 MMHG | HEART RATE: 78 BPM | WEIGHT: 167 LBS | OXYGEN SATURATION: 98 % | TEMPERATURE: 97.6 F | BODY MASS INDEX: 28.51 KG/M2

## 2023-11-22 DIAGNOSIS — Z91.89 HIGH RISK FOR COLON CANCER: ICD-10-CM

## 2023-11-22 DIAGNOSIS — Z86.010 HX OF COLONIC POLYPS: ICD-10-CM

## 2023-11-22 PROCEDURE — 45385 COLONOSCOPY W/LESION REMOVAL: CPT | Performed by: INTERNAL MEDICINE

## 2023-11-22 PROCEDURE — 88305 TISSUE EXAM BY PATHOLOGIST: CPT | Performed by: SPECIALIST

## 2023-11-22 PROCEDURE — 45380 COLONOSCOPY AND BIOPSY: CPT | Performed by: INTERNAL MEDICINE

## 2023-11-22 RX ORDER — PHENYLEPHRINE HCL IN 0.9% NACL 1 MG/10 ML
SYRINGE (ML) INTRAVENOUS AS NEEDED
Status: DISCONTINUED | OUTPATIENT
Start: 2023-11-22 | End: 2023-11-22

## 2023-11-22 RX ORDER — PROPOFOL 10 MG/ML
INJECTION, EMULSION INTRAVENOUS AS NEEDED
Status: DISCONTINUED | OUTPATIENT
Start: 2023-11-22 | End: 2023-11-22

## 2023-11-22 RX ORDER — SODIUM CHLORIDE, SODIUM LACTATE, POTASSIUM CHLORIDE, CALCIUM CHLORIDE 600; 310; 30; 20 MG/100ML; MG/100ML; MG/100ML; MG/100ML
50 INJECTION, SOLUTION INTRAVENOUS CONTINUOUS
Status: DISCONTINUED | OUTPATIENT
Start: 2023-11-22 | End: 2023-11-26 | Stop reason: HOSPADM

## 2023-11-22 RX ADMIN — PROPOFOL 50 MG: 10 INJECTION, EMULSION INTRAVENOUS at 11:25

## 2023-11-22 RX ADMIN — Medication 100 MCG: at 11:26

## 2023-11-22 RX ADMIN — PROPOFOL 50 MG: 10 INJECTION, EMULSION INTRAVENOUS at 11:29

## 2023-11-22 RX ADMIN — Medication 100 MCG: at 11:28

## 2023-11-22 RX ADMIN — Medication 200 MCG: at 11:14

## 2023-11-22 RX ADMIN — Medication 100 MCG: at 11:23

## 2023-11-22 RX ADMIN — Medication 100 MCG: at 11:20

## 2023-11-22 RX ADMIN — PROPOFOL 50 MG: 10 INJECTION, EMULSION INTRAVENOUS at 11:17

## 2023-11-22 RX ADMIN — PROPOFOL 50 MG: 10 INJECTION, EMULSION INTRAVENOUS at 11:21

## 2023-11-22 RX ADMIN — Medication 100 MCG: at 11:17

## 2023-11-22 RX ADMIN — SODIUM CHLORIDE, SODIUM LACTATE, POTASSIUM CHLORIDE, CALCIUM CHLORIDE: 600; 310; 30; 20 INJECTION, SOLUTION INTRAVENOUS at 11:00

## 2023-11-22 RX ADMIN — PROPOFOL 50 MG: 10 INJECTION, EMULSION INTRAVENOUS at 11:13

## 2023-11-22 RX ADMIN — Medication 100 MCG: at 11:30

## 2023-11-22 RX ADMIN — PROPOFOL 100 MG: 10 INJECTION, EMULSION INTRAVENOUS at 11:11

## 2023-11-22 NOTE — H&P
History and Physical - [facility]  Harshal Swift 56 y.o. male MRN: [id number]                  HPI: Harshal Swift is a 56y.o. year old male who presents for history of multiple adenomatous polyps. REVIEW OF SYSTEMS: Per the HPI, and otherwise unremarkable. Historical Information   Past Medical History:   Diagnosis Date    Colon polyp     Depression     Diverticulitis of large intestine with abscess without bleeding      Past Surgical History:   Procedure Laterality Date    COLONOSCOPY      FL RETROGRADE PYELOGRAM  09/22/2020    MT CYSTO BLADDER W/URETERAL CATHETERIZATION Bilateral 09/22/2020    Procedure: CYSTOSCOPY RETROGRADE PYELOGRAM;  Surgeon: Subhash Faulkner MD;  [facility];  Service: Urology    MT CYSTO W/REMOVAL OF TUMORS SMALL N/A 09/22/2020    Procedure: TRANSURETHRAL RESECTION OF BLADDER TUMOR (TURBT); Surgeon: Subhash Faulkner MD;  [facility];  Service: Urology    TRANSURETHRAL RESECTION OF PROSTATE N/A 09/22/2020    Procedure: Transurethral incision of prostate;  Surgeon: Subhash Faulkner MD;  [facility];  Service: Urology    WISDOM TOOTH EXTRACTION       Social History   Social History     Substance and Sexual Activity   Alcohol Use No     Social History     Substance and Sexual Activity   Drug Use No    Comment: last used crack/cocaine in 2000     Social History     Tobacco Use   Smoking Status Every Day    Packs/day: 1.00    Years: 45.00    Total pack years: 45.00    Types: Cigarettes   Smokeless Tobacco Never     Family History   Problem Relation Age of Onset    Stroke Mother     Hypertension Father        Meds/Allergies     No current outpatient medications on file.     Current Facility-Administered Medications:     lactated ringers infusion, 50 mL/hr, Intravenous, Continuous    No Known Allergies    Objective     /66   Pulse 66   Temp 97.6 °F (36.4 °C) (Temporal)   Resp 18   Ht 5' 4" (1.626 m)   Wt 75.8 kg (167 lb)   SpO2 96%   BMI 28.67 kg/m²       PHYSICAL EXAM    Gen: NAD  Head: NCAT  CV: RRR  CHEST: Clear  ABD: soft, NT/ND  EXT: no edema      ASSESSMENT/PLAN:  This is a 56y.o. year old male here for colonoscopy, and he is stable and optimized for his procedure.

## 2023-11-22 NOTE — ANESTHESIA POSTPROCEDURE EVALUATION
Post-Op Assessment Note    CV Status:  Stable  Pain Score: 0    Pain management: adequate       Mental Status:  Sleepy and arousable   Hydration Status:  Stable   PONV Controlled:  Controlled   Airway Patency:  Patent     Post Op Vitals Reviewed: Yes    No anethesia notable event occurred.     Staff: CRNA               BP   101/59   Temp      Pulse  68   Resp   15   SpO2   97

## 2023-11-22 NOTE — ANESTHESIA PREPROCEDURE EVALUATION
Procedure:  COLONOSCOPY    Relevant Problems   PULMONARY   (+) Smoking        Physical Exam    Airway    Mallampati score: II  TM Distance: >3 FB  Neck ROM: full     Dental       Cardiovascular      Pulmonary      Other Findings        Anesthesia Plan  ASA Score- 2     Anesthesia Type- IV sedation with anesthesia with ASA Monitors. Additional Monitors:     Airway Plan:     Comment: Smoker. Plan Factors-Exercise tolerance (METS): >4 METS. Chart reviewed. Patient is a current smoker. Patient smoked on day of surgery. Obstructive sleep apnea risk education given perioperatively. Induction- intravenous. Postoperative Plan-     Informed Consent- Anesthetic plan and risks discussed with patient. I personally reviewed this patient with the CRNA. Discussed and agreed on the Anesthesia Plan with the CRNA. .            NPO appropriate. Discussed benefits/risks of monitored anesthetic care and discussed providing a dynamic level of mild to deep sedation. Risks include awareness, airway obstruction, aspiration which may necessitate conversion to general anesthesia. All questions answered. Patient understands and wishes to proceed. Anesthesia plan and consent discussed with Wing Cooper who expressed understanding and agreement. Risks/benefits and alternatives discussed with patient including possible PONV, sore throat, damage to teeth/lips/gums and possibility of rare anesthetic and surgical emergencies.

## 2023-11-27 PROCEDURE — 88305 TISSUE EXAM BY PATHOLOGIST: CPT | Performed by: SPECIALIST

## 2023-11-29 ENCOUNTER — TELEPHONE (OUTPATIENT)
Dept: GASTROENTEROLOGY | Facility: CLINIC | Age: 58
End: 2023-11-29

## 2023-11-29 NOTE — RESULT ENCOUNTER NOTE
Patient had adenoma polyp removed during colonoscopy. This is considered precancerous polyp. Patient should have a follow-up colonoscopy in 1 year because of multiple flat polyps removed today. Please enter recall. .  If any GI symptoms call our office for evaluation accordingly.

## 2023-11-29 NOTE — TELEPHONE ENCOUNTER
Attempted to call patient, main phone number is no longer in service. Other number the mailbox was full. Will try again. 1 year colon recall entered into chart.

## 2023-11-29 NOTE — TELEPHONE ENCOUNTER
----- Message from Stephany Paiz MD sent at 11/29/2023  1:30 PM EST -----  Patient had adenoma polyp removed during colonoscopy. This is considered precancerous polyp. Patient should have a follow-up colonoscopy in 1 year because of multiple flat polyps removed today. Please enter recall. .  If any GI symptoms call our office for evaluation accordingly.

## 2024-08-05 ENCOUNTER — RA CDI HCC (OUTPATIENT)
Dept: OTHER | Facility: HOSPITAL | Age: 59
End: 2024-08-05

## 2024-08-12 ENCOUNTER — OFFICE VISIT (OUTPATIENT)
Dept: FAMILY MEDICINE CLINIC | Facility: CLINIC | Age: 59
End: 2024-08-12
Payer: COMMERCIAL

## 2024-08-12 ENCOUNTER — PATIENT MESSAGE (OUTPATIENT)
Dept: FAMILY MEDICINE CLINIC | Facility: CLINIC | Age: 59
End: 2024-08-12

## 2024-08-12 VITALS
WEIGHT: 172 LBS | BODY MASS INDEX: 29.37 KG/M2 | HEIGHT: 64 IN | DIASTOLIC BLOOD PRESSURE: 72 MMHG | SYSTOLIC BLOOD PRESSURE: 118 MMHG | OXYGEN SATURATION: 97 % | HEART RATE: 70 BPM

## 2024-08-12 DIAGNOSIS — R73.03 PREDIABETES: ICD-10-CM

## 2024-08-12 DIAGNOSIS — Z00.00 ANNUAL PHYSICAL EXAM: ICD-10-CM

## 2024-08-12 DIAGNOSIS — E78.5 DYSLIPIDEMIA: ICD-10-CM

## 2024-08-12 DIAGNOSIS — F17.200 SMOKING: ICD-10-CM

## 2024-08-12 DIAGNOSIS — Z12.11 SCREEN FOR COLON CANCER: ICD-10-CM

## 2024-08-12 DIAGNOSIS — Z00.00 PREVENTATIVE HEALTH CARE: ICD-10-CM

## 2024-08-12 DIAGNOSIS — Z12.5 SCREENING FOR PROSTATE CANCER: ICD-10-CM

## 2024-08-12 DIAGNOSIS — L60.9 NAIL ABNORMALITIES: Primary | ICD-10-CM

## 2024-08-12 PROCEDURE — 99396 PREV VISIT EST AGE 40-64: CPT | Performed by: FAMILY MEDICINE

## 2024-08-12 NOTE — ASSESSMENT & PLAN NOTE
Patient has concavo-convex deformity, with a horizontal ridge.  patient has history of colonic polyps, check CBC to rule out anemia.  Also advised to quit smoking

## 2024-08-12 NOTE — ASSESSMENT & PLAN NOTE
Patient encouraged to continue with low-carb diet.  Check A1c to rule out progression to diabetes.

## 2024-08-12 NOTE — ASSESSMENT & PLAN NOTE
Smoking cessation advised.  Patient was able to quit cold turkey in the past however recently having difficulty.  I have advised him to wean himself off slowly, if unable to do so will follow-up to discuss treatment options.

## 2024-08-12 NOTE — PROGRESS NOTES
Adult Annual Physical  Name: Tong Rojas      : 1965      MRN: [id number]  Encounter Provider: Michaela Barrios MD  Encounter Date: 2024   Encounter department: [facility]    Assessment & Plan   1. Nail abnormalities  Assessment & Plan:  Patient has concavo-convex deformity, with a horizontal ridge.  patient has history of colonic polyps, check CBC to rule out anemia.  Also advised to quit smoking  Orders:  -     CBC and differential  2. Screening for prostate cancer  -     PSA, Total Screen; Future  3. Preventative health care  Assessment & Plan:  Patient is due for metabolic labs.  Up-to-date on colorectal cancer screening.  PSA ordered for prostate cancer.  4. Prediabetes  Assessment & Plan:  Patient encouraged to continue with low-carb diet.  Check A1c to rule out progression to diabetes.  Orders:  -     Comprehensive metabolic panel  -     Hemoglobin A1C  5. Dyslipidemia  Assessment & Plan:  LDL borderline.  Encouraged to continue with low-fat diet.  Recommended metabolic labs.  Orders:  -     Lipid panel  6. Smoking  Assessment & Plan:  Smoking cessation advised.  Patient was able to quit cold turkey in the past however recently having difficulty.  I have advised him to wean himself off slowly, if unable to do so will follow-up to discuss treatment options.  Orders:  -     CT lung screening program; Future; Expected date: 2024  7. Screen for colon cancer  -     Ambulatory Referral to Gastroenterology; Future  8. Annual physical exam    Immunizations and preventive care screenings were discussed with patient today. Appropriate education was printed on patient's after visit summary.    Discussed risks and benefits of prostate cancer screening. We discussed the controversial history of PSA screening for prostate cancer in the [place] as well as the risk of over detection and over treatment of prostate cancer by way of PSA screening.  The patient understands that  PSA blood testing is an imperfect way to screen for prostate cancer and that elevated PSA levels in the blood may also be caused by infection, inflammation, prostatic trauma or manipulation, urological procedures, or by benign prostatic enlargement.    The role of the digital rectal examination in prostate cancer screening was also discussed and I discussed with him that there is large interobserver variability in the findings of digital rectal examination.    Counseling:  Exercise: the importance of regular exercise/physical activity was discussed. Recommend exercise 3-5 times per week for at least 30 minutes.       Depression Screening and Follow-up Plan: Patient was screened for depression during today's encounter. They screened negative with a PHQ-2 score of 0.    Tobacco Cessation Counseling: Tobacco cessation counseling was provided. The patient is sincerely urged to quit consumption of tobacco. He is ready to quit tobacco.     Lung Cancer Screening Shared Decision Making: I discussed with him that he is a candidate for lung cancer CT screening.     The following Shared Decision-Making points were covered:  Benefits of screening were discussed, including the rates of reduction in death from lung cancer and other causes.  Harms of screening were reviewed, including false positive tests, radiation exposure levels, risks of invasive procedures, risks of complications of screening, and risk of overdiagnosis.  I counseled on the importance of adherence to annual lung cancer LDCT screening, impact of co-morbidities, and ability or willingness to undergo diagnosis and treatment.  I counseled on the importance of maintaining abstinence as a former smoker or was counseled on the importance of smoking cessation if a current smoker    Review of Eligibility Criteria: He meets all of the criteria for Lung Cancer Screening.   - He is 58 y.o.   - He has 20 pack year tobacco history and is a current smoker or has quit within  "the past 15 years  - He presents no signs or symptoms of lung cancer    After discussion, the patient decided to elect lung cancer screening.        History of Present Illness     Patient is here for annual physical.  Reports no medical problems today.   Has noted deformity of multiple nails on left hand.       Adult Annual Physical:  Patient presents for annual physical.     Diet and Physical Activity:  - Diet/Nutrition: well balanced diet.  - Exercise: walking.    Depression Screening:  - PHQ-2 Score: 0    General Health:  - Sleep: sleeps well.  - Hearing: normal hearing bilateral ears.  - Vision: no vision problems.    Review of Systems   Constitutional: Negative.    Respiratory: Negative.     Cardiovascular: Negative.      No current outpatient medications on file prior to visit.     No current facility-administered medications on file prior to visit.        Objective     /72   Pulse 70   Ht 5' 4\" (1.626 m)   Wt 78 kg (172 lb)   SpO2 97%   BMI 29.52 kg/m²     Physical Exam  Constitutional:       Appearance: Normal appearance.   Cardiovascular:      Heart sounds: Normal heart sounds.   Pulmonary:      Breath sounds: Normal breath sounds.   Abdominal:      Palpations: Abdomen is soft.   Musculoskeletal:      Right lower leg: No edema.      Left lower leg: No edema.   Skin:     Comments: Deformity of nails of left hand.          "

## 2024-08-12 NOTE — ASSESSMENT & PLAN NOTE
Patient is due for metabolic labs.  Up-to-date on colorectal cancer screening.  PSA ordered for prostate cancer.

## 2024-08-12 NOTE — PATIENT INSTRUCTIONS
"Patient Education     Routine physical for adults   The Basics   Written by the doctors and editors at [facility]   What is a physical? -- A physical is a routine visit, or \"check-up,\" with your doctor. You might also hear it called a \"wellness visit\" or \"preventive visit.\"  During each visit, the doctor will:   Ask about your physical and mental health   Ask about your habits, behaviors, and lifestyle   Do an exam   Give you vaccines if needed   Talk to you about any medicines you take   Give advice about your health   Answer your questions  Getting regular check-ups is an important part of taking care of your health. It can help your doctor find and treat any problems you have. But it's also important for preventing health problems.  A routine physical is different from a \"sick visit.\" A sick visit is when you see a doctor because of a health concern or problem. Since physicals are scheduled ahead of time, you can think about what you want to ask the doctor.  How often should I get a physical? -- It depends on your age and health. In general, for people age 21 years and older:   If you are younger than 50 years, you might be able to get a physical every 3 years.   If you are 50 years or older, your doctor might recommend a physical every year.  If you have an ongoing health condition, like diabetes or high blood pressure, your doctor will probably want to see you more often.  What happens during a physical? -- In general, each visit will include:   Physical exam - The doctor or nurse will check your height, weight, heart rate, and blood pressure. They will also look at your eyes and ears. They will ask about how you are feeling and whether you have any symptoms that bother you.   Medicines - It's a good idea to bring a list of all the medicines you take to each doctor visit. Your doctor will talk to you about your medicines and answer any questions. Tell them if you are having any side effects that bother you. You " "should also tell them if you are having trouble paying for any of your medicines.   Habits and behaviors - This includes:   Your diet   Your exercise habits   Whether you smoke, drink alcohol, or use drugs   Whether you are sexually active   Whether you feel safe at home  Your doctor will talk to you about things you can do to improve your health and lower your risk of health problems. They will also offer help and support. For example, if you want to quit smoking, they can give you advice and might prescribe medicines. If you want to improve your diet or get more physical activity, they can help you with this, too.   Lab tests, if needed - The tests you get will depend on your age and situation. For example, your doctor might want to check your:   Cholesterol   Blood sugar   Iron level   Vaccines - The recommended vaccines will depend on your age, health, and what vaccines you already had. Vaccines are very important because they can prevent certain serious or deadly infections.   Discussion of screening - \"Screening\" means checking for diseases or other health problems before they cause symptoms. Your doctor can recommend screening based on your age, risk, and preferences. This might include tests to check for:   Cancer, such as breast, prostate, cervical, ovarian, colorectal, prostate, lung, or skin cancer   Sexually transmitted infections, such as chlamydia and gonorrhea   Mental health conditions like depression and anxiety  Your doctor will talk to you about the different types of screening tests. They can help you decide which screenings to have. They can also explain what the results might mean.   Answering questions - The physical is a good time to ask the doctor or nurse questions about your health. If needed, they can refer you to other doctors or specialists, too.  Adults older than 65 years often need other care, too. As you get older, your doctor will talk to you about:   How to prevent falling at " home   Hearing or vision tests   Memory testing   How to take your medicines safely   Making sure that you have the help and support you need at home  All topics are updated as new evidence becomes available and our peer review process is complete.  This topic retrieved from [employer] on: May 02, 2024.  Topic 900726 Version 1.0  Release: 32.4.3 - C32.122  © 2024 UpToDate, Inc. and/or its affiliates. All rights reserved.  Consumer Information Use and Disclaimer   Disclaimer: This generalized information is a limited summary of diagnosis, treatment, and/or medication information. It is not meant to be comprehensive and should be used as a tool to help the user understand and/or assess potential diagnostic and treatment options. It does NOT include all information about conditions, treatments, medications, side effects, or risks that may apply to a specific patient. It is not intended to be medical advice or a substitute for the medical advice, diagnosis, or treatment of a health care provider based on the health care provider's examination and assessment of a patient's specific and unique circumstances. Patients must speak with a health care provider for complete information about their health, medical questions, and treatment options, including any risks or benefits regarding use of medications. This information does not endorse any treatments or medications as safe, effective, or approved for treating a specific patient. UpToDate, Inc. and its affiliates disclaim any warranty or liability relating to this information or the use thereof.The use of this information is governed by the Terms of Use, available at https://www.w[employer]tersDynamicOpsuwer.com/en/know/clinical-effectiveness-terms. 2024© UpToDate, Inc. and its affiliates and/or licensors. All rights reserved.  Copyright   © 2024 UpToDate, Inc. and/or its affiliates. All rights reserved.

## 2024-08-13 NOTE — PATIENT COMMUNICATION
Patient's wife called in - she meant a print out of the CBC lab order was not included with the AVS yesterday. I confirmed they use a [facility] and the lab staff will see the order for the CBC.

## 2024-08-22 ENCOUNTER — HOSPITAL ENCOUNTER (OUTPATIENT)
Dept: CT IMAGING | Facility: HOSPITAL | Age: 59
End: 2024-08-22
Payer: COMMERCIAL

## 2024-08-22 DIAGNOSIS — F17.200 SMOKING: ICD-10-CM

## 2024-08-22 PROCEDURE — 71271 CT THORAX LUNG CANCER SCR C-: CPT

## 2024-08-23 ENCOUNTER — APPOINTMENT (OUTPATIENT)
Dept: LAB | Facility: AMBULARY SURGERY CENTER | Age: 59
End: 2024-08-23
Payer: COMMERCIAL

## 2024-08-23 DIAGNOSIS — Z12.5 SCREENING FOR PROSTATE CANCER: ICD-10-CM

## 2024-08-23 LAB
ALBUMIN SERPL BCG-MCNC: 4.1 G/DL (ref 3.5–5)
ALP SERPL-CCNC: 61 U/L (ref 34–104)
ALT SERPL W P-5'-P-CCNC: 21 U/L (ref 7–52)
ANION GAP SERPL CALCULATED.3IONS-SCNC: 8 MMOL/L (ref 4–13)
AST SERPL W P-5'-P-CCNC: 18 U/L (ref 13–39)
BASOPHILS # BLD AUTO: 0.06 THOUSANDS/ÂΜL (ref 0–0.1)
BASOPHILS NFR BLD AUTO: 1 % (ref 0–1)
BILIRUB SERPL-MCNC: 0.81 MG/DL (ref 0.2–1)
BUN SERPL-MCNC: 14 MG/DL (ref 5–25)
CALCIUM SERPL-MCNC: 9.1 MG/DL (ref 8.4–10.2)
CHLORIDE SERPL-SCNC: 104 MMOL/L (ref 96–108)
CHOLEST SERPL-MCNC: 198 MG/DL
CO2 SERPL-SCNC: 27 MMOL/L (ref 21–32)
CREAT SERPL-MCNC: 0.73 MG/DL (ref 0.6–1.3)
EOSINOPHIL # BLD AUTO: 0.28 THOUSAND/ÂΜL (ref 0–0.61)
EOSINOPHIL NFR BLD AUTO: 4 % (ref 0–6)
ERYTHROCYTE [DISTWIDTH] IN BLOOD BY AUTOMATED COUNT: 13.2 % (ref 11.6–15.1)
EST. AVERAGE GLUCOSE BLD GHB EST-MCNC: 126 MG/DL
GFR SERPL CREATININE-BSD FRML MDRD: 103 ML/MIN/1.73SQ M
GLUCOSE P FAST SERPL-MCNC: 99 MG/DL (ref 65–99)
HBA1C MFR BLD: 6 %
HCT VFR BLD AUTO: 48.1 % (ref 36.5–49.3)
HDLC SERPL-MCNC: 50 MG/DL
HGB BLD-MCNC: 15.6 G/DL (ref 12–17)
IMM GRANULOCYTES # BLD AUTO: 0.02 THOUSAND/UL (ref 0–0.2)
IMM GRANULOCYTES NFR BLD AUTO: 0 % (ref 0–2)
LDLC SERPL CALC-MCNC: 122 MG/DL (ref 0–100)
LYMPHOCYTES # BLD AUTO: 1.78 THOUSANDS/ÂΜL (ref 0.6–4.47)
LYMPHOCYTES NFR BLD AUTO: 28 % (ref 14–44)
MCH RBC QN AUTO: 30 PG (ref 26.8–34.3)
MCHC RBC AUTO-ENTMCNC: 32.4 G/DL (ref 31.4–37.4)
MCV RBC AUTO: 93 FL (ref 82–98)
MONOCYTES # BLD AUTO: 0.66 THOUSAND/ÂΜL (ref 0.17–1.22)
MONOCYTES NFR BLD AUTO: 10 % (ref 4–12)
NEUTROPHILS # BLD AUTO: 3.56 THOUSANDS/ÂΜL (ref 1.85–7.62)
NEUTS SEG NFR BLD AUTO: 57 % (ref 43–75)
NONHDLC SERPL-MCNC: 148 MG/DL
NRBC BLD AUTO-RTO: 0 /100 WBCS
PLATELET # BLD AUTO: 233 THOUSANDS/UL (ref 149–390)
PMV BLD AUTO: 10.8 FL (ref 8.9–12.7)
POTASSIUM SERPL-SCNC: 3.7 MMOL/L (ref 3.5–5.3)
PROT SERPL-MCNC: 6.5 G/DL (ref 6.4–8.4)
PSA SERPL-MCNC: 0.4 NG/ML (ref 0–4)
RBC # BLD AUTO: 5.2 MILLION/UL (ref 3.88–5.62)
SODIUM SERPL-SCNC: 139 MMOL/L (ref 135–147)
TRIGL SERPL-MCNC: 131 MG/DL
WBC # BLD AUTO: 6.36 THOUSAND/UL (ref 4.31–10.16)

## 2024-08-23 PROCEDURE — 80061 LIPID PANEL: CPT | Performed by: FAMILY MEDICINE

## 2024-08-23 PROCEDURE — 83036 HEMOGLOBIN GLYCOSYLATED A1C: CPT | Performed by: FAMILY MEDICINE

## 2024-08-23 PROCEDURE — G0103 PSA SCREENING: HCPCS

## 2024-08-23 PROCEDURE — 36415 COLL VENOUS BLD VENIPUNCTURE: CPT

## 2024-08-23 PROCEDURE — 80053 COMPREHEN METABOLIC PANEL: CPT | Performed by: FAMILY MEDICINE

## 2024-08-23 PROCEDURE — 85025 COMPLETE CBC W/AUTO DIFF WBC: CPT | Performed by: FAMILY MEDICINE

## 2024-08-26 ENCOUNTER — TELEPHONE (OUTPATIENT)
Dept: FAMILY MEDICINE CLINIC | Facility: CLINIC | Age: 59
End: 2024-08-26

## 2024-08-26 NOTE — TELEPHONE ENCOUNTER
----- Message from Michaela Barrios MD sent at 8/26/2024 10:03 AM EDT -----  Please contact the patient and advise a follow-up to discuss CAT scan result/cholesterol results to discuss possibly starting him on medication.

## 2024-08-26 NOTE — TELEPHONE ENCOUNTER
----- Message from Michaela Barrios MD sent at 8/25/2024 10:58 AM EDT -----  Please call patient with stable results. Sugar and cholesterol remain borderline. He should continue with healthy diet.

## 2024-08-29 ENCOUNTER — OFFICE VISIT (OUTPATIENT)
Dept: FAMILY MEDICINE CLINIC | Facility: CLINIC | Age: 59
End: 2024-08-29

## 2024-08-29 VITALS
SYSTOLIC BLOOD PRESSURE: 110 MMHG | HEIGHT: 64 IN | WEIGHT: 167 LBS | DIASTOLIC BLOOD PRESSURE: 60 MMHG | OXYGEN SATURATION: 96 % | BODY MASS INDEX: 28.51 KG/M2 | HEART RATE: 79 BPM

## 2024-08-29 DIAGNOSIS — R73.03 PREDIABETES: ICD-10-CM

## 2024-08-29 DIAGNOSIS — E78.5 DYSLIPIDEMIA: Primary | ICD-10-CM

## 2024-08-29 DIAGNOSIS — J98.11 MILD BASILAR ATELECTASIS OF BOTH LUNGS: ICD-10-CM

## 2024-08-29 DIAGNOSIS — F17.200 SMOKING: ICD-10-CM

## 2024-08-29 RX ORDER — ROSUVASTATIN CALCIUM 10 MG/1
10 TABLET, COATED ORAL DAILY
Qty: 100 TABLET | Refills: 3 | Status: SHIPPED | OUTPATIENT
Start: 2024-08-29

## 2024-08-29 NOTE — ASSESSMENT & PLAN NOTE
Secondary to longstanding smoking.  Patient has been trying to wean himself off smoking.  Currently asymptomatic.  Prefers not to start any inhalers.

## 2024-08-29 NOTE — ASSESSMENT & PLAN NOTE
Patient noted to have dyslipidemia with .  Recent CAT scan of the lung reveals coronary artery calcification.  Patient is asymptomatic but has a family history of coronary artery disease.  Different options discussed with patient.  Patient agrees to start statin for cardiovascular risk modification.  Medication effect side effects discussed with patient.  Recommended to repeat metabolic panel at 4 months interval.

## 2024-08-29 NOTE — ASSESSMENT & PLAN NOTE
Smoking cessation advised.  Patient was able to quit cold turkey in the past however recently having difficulty.  I have advised him to wean himself off slowly, he will be trying over-the-counter NicoDerm patches to see if that helps.  Patient does not want to try any oral medications yet.

## 2024-08-29 NOTE — PROGRESS NOTES
Subjective:      Patient ID: Tong Rojas is a 58 y.o. male.    HPI    Patient is following up on his chronic medical problems.  Has history of dyslipidemia, prediabetes.  Metabolic labs reviewed with patient.  Noted to have A1c of 6.0.  LDL borderline high.  Patient noted to have coronary artery calcification on the most recent CAT scan of the lung which was ordered as a routine screening.  CAT scan also reveals bilateral mild basilar atelectasis which has been attributed to his smoking.  Patient is asymptomatic and also plans to quit smoking.  Has not experienced any symptoms of chest pain or shortness of breath or chronic productive cough.       Past Medical History:   Diagnosis Date    Colon polyp     Depression     Diverticulitis of large intestine with abscess without bleeding        Family History   Problem Relation Age of Onset    Stroke Mother     Hypertension Father        Past Surgical History:   Procedure Laterality Date    COLONOSCOPY      FL RETROGRADE PYELOGRAM  09/22/2020    ID CYSTO BLADDER W/URETERAL CATHETERIZATION Bilateral 09/22/2020    Procedure: CYSTOSCOPY RETROGRADE PYELOGRAM;  Surgeon: Tarun Santoro MD;  Location: [facility];  Service: Urology    ID CYSTO W/REMOVAL OF TUMORS SMALL N/A 09/22/2020    Procedure: TRANSURETHRAL RESECTION OF BLADDER TUMOR (TURBT);  Surgeon: Tarun Santoro MD;  [facility];  Service: Urology    TRANSURETHRAL RESECTION OF PROSTATE N/A 09/22/2020    Procedure: Transurethral incision of prostate;  Surgeon: Tarun Santoro MD;  Location: [facility];  Service: Urology    WISDOM TOOTH EXTRACTION          reports that he has been smoking cigarettes. He has a 45 pack-year smoking history. He has never used smokeless tobacco. He reports that he does not drink alcohol and does not use drugs.      Current Outpatient Medications:     rosuvastatin (CRESTOR) 10 MG tablet, Take 1 tablet (10 mg total) by mouth daily, Disp: 100 tablet, Rfl: 3    The  "following portions of the patient's history were reviewed and updated as appropriate: allergies, current medications, past family history, past medical history, past social history, past surgical history and problem list.    Review of Systems   Respiratory: Negative.     Cardiovascular: Negative.            Objective:    /60   Pulse 79   Ht 5' 4\" (1.626 m)   Wt 75.8 kg (167 lb)   SpO2 96%   BMI 28.67 kg/m²      Physical Exam  Cardiovascular:      Heart sounds: Normal heart sounds.   Pulmonary:      Breath sounds: Normal breath sounds.           Recent Results (from the past 1008 hour(s))   Comprehensive metabolic panel    Collection Time: 08/23/24  7:58 AM   Result Value Ref Range    Sodium 139 135 - 147 mmol/L    Potassium 3.7 3.5 - 5.3 mmol/L    Chloride 104 96 - 108 mmol/L    CO2 27 21 - 32 mmol/L    ANION GAP 8 4 - 13 mmol/L    BUN 14 5 - 25 mg/dL    Creatinine 0.73 0.60 - 1.30 mg/dL    Glucose, Fasting 99 65 - 99 mg/dL    Calcium 9.1 8.4 - 10.2 mg/dL    AST 18 13 - 39 U/L    ALT 21 7 - 52 U/L    Alkaline Phosphatase 61 34 - 104 U/L    Total Protein 6.5 6.4 - 8.4 g/dL    Albumin 4.1 3.5 - 5.0 g/dL    Total Bilirubin 0.81 0.20 - 1.00 mg/dL    eGFR 103 ml/min/1.73sq m   Hemoglobin A1C    Collection Time: 08/23/24  7:58 AM   Result Value Ref Range    Hemoglobin A1C 6.0 (H) Normal 4.0-5.6%; PreDiabetic 5.7-6.4%; Diabetic >=6.5%; Glycemic control for adults with diabetes <7.0% %     mg/dl   Lipid panel    Collection Time: 08/23/24  7:58 AM   Result Value Ref Range    Cholesterol 198 See Comment mg/dL    Triglycerides 131 See Comment mg/dL    HDL, Direct 50 >=40 mg/dL    LDL Calculated 122 (H) 0 - 100 mg/dL    Non-HDL-Chol (CHOL-HDL) 148 mg/dl   CBC and differential    Collection Time: 08/23/24  7:58 AM   Result Value Ref Range    WBC 6.36 4.31 - 10.16 Thousand/uL    RBC 5.20 3.88 - 5.62 Million/uL    Hemoglobin 15.6 12.0 - 17.0 g/dL    Hematocrit 48.1 36.5 - 49.3 %    MCV 93 82 - 98 fL    MCH 30.0 " 26.8 - 34.3 pg    MCHC 32.4 31.4 - 37.4 g/dL    RDW 13.2 11.6 - 15.1 %    MPV 10.8 8.9 - 12.7 fL    Platelets 233 149 - 390 Thousands/uL    nRBC 0 /100 WBCs    Segmented % 57 43 - 75 %    Immature Grans % 0 0 - 2 %    Lymphocytes % 28 14 - 44 %    Monocytes % 10 4 - 12 %    Eosinophils Relative 4 0 - 6 %    Basophils Relative 1 0 - 1 %    Absolute Neutrophils 3.56 1.85 - 7.62 Thousands/µL    Absolute Immature Grans 0.02 0.00 - 0.20 Thousand/uL    Absolute Lymphocytes 1.78 0.60 - 4.47 Thousands/µL    Absolute Monocytes 0.66 0.17 - 1.22 Thousand/µL    Eosinophils Absolute 0.28 0.00 - 0.61 Thousand/µL    Basophils Absolute 0.06 0.00 - 0.10 Thousands/µL   PSA, Total Screen    Collection Time: 08/23/24  7:58 AM   Result Value Ref Range    PSA 0.401 0.000 - 4.000 ng/mL       Assessment/Plan:    No problem-specific Assessment & Plan notes found for this encounter.           Problem List Items Addressed This Visit          Respiratory    Mild basilar atelectasis of both lungs     Secondary to longstanding smoking.  Patient has been trying to wean himself off smoking.  Currently asymptomatic.  Prefers not to start any inhalers.           Relevant Orders    CT lung screening program       Behavioral Health    Smoking     Smoking cessation advised.  Patient was able to quit cold turkey in the past however recently having difficulty.  I have advised him to wean himself off slowly, he will be trying over-the-counter NicoDerm patches to see if that helps.  Patient does not want to try any oral medications yet.            Other    Prediabetes     Patient has history of prediabetes, A1c 6.0.  Encouraged to continue with low-carb diet/exercise/monitoring.         Relevant Orders    Comprehensive metabolic panel    Hemoglobin A1C    Dyslipidemia - Primary     Patient noted to have dyslipidemia with .  Recent CAT scan of the lung reveals coronary artery calcification.  Patient is asymptomatic but has a family history of  coronary artery disease.  Different options discussed with patient.  Patient agrees to start statin for cardiovascular risk modification.  Medication effect side effects discussed with patient.  Recommended to repeat metabolic panel at 4 months interval.         Relevant Medications    rosuvastatin (CRESTOR) 10 MG tablet    Other Relevant Orders    Lipid panel

## 2024-08-29 NOTE — ASSESSMENT & PLAN NOTE
Patient has history of prediabetes, A1c 6.0.  Encouraged to continue with low-carb diet/exercise/monitoring.

## 2024-08-30 ENCOUNTER — PREP FOR PROCEDURE (OUTPATIENT)
Age: 59
End: 2024-08-30

## 2024-08-30 ENCOUNTER — TELEPHONE (OUTPATIENT)
Age: 59
End: 2024-08-30

## 2024-08-30 DIAGNOSIS — Z91.89 HIGH RISK FOR COLON CANCER: ICD-10-CM

## 2024-08-30 DIAGNOSIS — Z86.010 HX OF COLONIC POLYPS: Primary | ICD-10-CM

## 2024-08-30 NOTE — TELEPHONE ENCOUNTER
Scheduled date of colonoscopy (as of today): 12/6/24    Physician performing colonoscopy: Dr. Stanford    Location of colonoscopy: [facility]

## 2024-08-30 NOTE — TELEPHONE ENCOUNTER
08/30/24  Screened by: Polina Urias MA    Referring Provider Dr. Barrios    Pre- Screening:     There is no height or weight on file to calculate BMI.28.67  Has patient been referred for a routine screening Colonoscopy? yes  Is the patient between 45-75 years old? yes      Previous Colonoscopy yes   If yes:    Date: 11/2023    Facility:     Reason:         Does the patient want to see a Gastroenterologist prior to their procedure OR are they having any GI symptoms? no    Has the patient been hospitalized or had abdominal surgery in the past 6 months? no    Does the patient use supplemental oxygen? no    Does the patient take Coumadin, Lovenox, Plavix, Elliquis, Xarelto, or other blood thinning medication? no    Has the patient had a stroke, cardiac event, or stent placed in the past year? no      If patient is between 45yrs - 49yrs, please advise patient that we will have to confirm benefits & coverage with their insurance company for a routine screening colonoscopy.

## 2024-12-06 ENCOUNTER — HOSPITAL ENCOUNTER (OUTPATIENT)
Dept: GASTROENTEROLOGY | Facility: HOSPITAL | Age: 59
Setting detail: OUTPATIENT SURGERY
End: 2024-12-06
Attending: INTERNAL MEDICINE
Payer: COMMERCIAL

## 2024-12-06 ENCOUNTER — ANESTHESIA EVENT (OUTPATIENT)
Dept: GASTROENTEROLOGY | Facility: HOSPITAL | Age: 59
End: 2024-12-06
Payer: COMMERCIAL

## 2024-12-06 ENCOUNTER — ANESTHESIA (OUTPATIENT)
Dept: GASTROENTEROLOGY | Facility: HOSPITAL | Age: 59
End: 2024-12-06
Payer: COMMERCIAL

## 2024-12-06 VITALS
BODY MASS INDEX: 27.83 KG/M2 | HEART RATE: 77 BPM | DIASTOLIC BLOOD PRESSURE: 68 MMHG | RESPIRATION RATE: 18 BRPM | WEIGHT: 163 LBS | HEIGHT: 64 IN | OXYGEN SATURATION: 97 % | TEMPERATURE: 97.2 F | SYSTOLIC BLOOD PRESSURE: 107 MMHG

## 2024-12-06 DIAGNOSIS — Z86.0100 HX OF COLONIC POLYPS: ICD-10-CM

## 2024-12-06 DIAGNOSIS — Z91.89 HIGH RISK FOR COLON CANCER: ICD-10-CM

## 2024-12-06 PROCEDURE — 45380 COLONOSCOPY AND BIOPSY: CPT | Performed by: INTERNAL MEDICINE

## 2024-12-06 PROCEDURE — 88305 TISSUE EXAM BY PATHOLOGIST: CPT | Performed by: PATHOLOGY

## 2024-12-06 PROCEDURE — 45385 COLONOSCOPY W/LESION REMOVAL: CPT | Performed by: INTERNAL MEDICINE

## 2024-12-06 RX ORDER — LIDOCAINE HYDROCHLORIDE 10 MG/ML
INJECTION, SOLUTION EPIDURAL; INFILTRATION; INTRACAUDAL; PERINEURAL AS NEEDED
Status: DISCONTINUED | OUTPATIENT
Start: 2024-12-06 | End: 2024-12-06

## 2024-12-06 RX ORDER — SODIUM CHLORIDE, SODIUM LACTATE, POTASSIUM CHLORIDE, CALCIUM CHLORIDE 600; 310; 30; 20 MG/100ML; MG/100ML; MG/100ML; MG/100ML
INJECTION, SOLUTION INTRAVENOUS CONTINUOUS PRN
Status: DISCONTINUED | OUTPATIENT
Start: 2024-12-06 | End: 2024-12-06

## 2024-12-06 RX ORDER — PROPOFOL 10 MG/ML
INJECTION, EMULSION INTRAVENOUS AS NEEDED
Status: DISCONTINUED | OUTPATIENT
Start: 2024-12-06 | End: 2024-12-06

## 2024-12-06 RX ADMIN — PROPOFOL 20 MG: 10 INJECTION, EMULSION INTRAVENOUS at 10:59

## 2024-12-06 RX ADMIN — SODIUM CHLORIDE, SODIUM LACTATE, POTASSIUM CHLORIDE, AND CALCIUM CHLORIDE: .6; .31; .03; .02 INJECTION, SOLUTION INTRAVENOUS at 09:46

## 2024-12-06 RX ADMIN — PROPOFOL 50 MG: 10 INJECTION, EMULSION INTRAVENOUS at 11:12

## 2024-12-06 RX ADMIN — PROPOFOL 80 MG: 10 INJECTION, EMULSION INTRAVENOUS at 10:58

## 2024-12-06 RX ADMIN — PROPOFOL 50 MG: 10 INJECTION, EMULSION INTRAVENOUS at 11:01

## 2024-12-06 RX ADMIN — PROPOFOL 50 MG: 10 INJECTION, EMULSION INTRAVENOUS at 11:06

## 2024-12-06 RX ADMIN — LIDOCAINE HYDROCHLORIDE 50 MG: 10 INJECTION, SOLUTION EPIDURAL; INFILTRATION; INTRACAUDAL at 10:58

## 2024-12-06 NOTE — H&P
History and Physical - [facility]  Tnog Rojas 59 y.o. male MRN: [id number]                  HPI: Tong Rojas is a 59 y.o. year old male who presents for history of polyps      REVIEW OF SYSTEMS: Per the HPI, and otherwise unremarkable.    Historical Information   Past Medical History:   Diagnosis Date    Colon polyp     Depression     Diverticulitis of large intestine with abscess without bleeding      Past Surgical History:   Procedure Laterality Date    COLONOSCOPY      FL RETROGRADE PYELOGRAM  09/22/2020    TX CYSTO BLADDER W/URETERAL CATHETERIZATION Bilateral 09/22/2020    Procedure: CYSTOSCOPY RETROGRADE PYELOGRAM;  Surgeon: Tarun Santoro MD;  [facility];  Service: Urology    TX CYSTO W/REMOVAL OF TUMORS SMALL N/A 09/22/2020    Procedure: TRANSURETHRAL RESECTION OF BLADDER TUMOR (TURBT);  Surgeon: Tarun Santoro MD;  [facility];  Service: Urology    TRANSURETHRAL RESECTION OF PROSTATE N/A 09/22/2020    Procedure: Transurethral incision of prostate;  Surgeon: Tarun Santoro MD;  [facility];  Service: Urology    WISDOM TOOTH EXTRACTION       Social History   Social History     Substance and Sexual Activity   Alcohol Use No     Social History     Substance and Sexual Activity   Drug Use No    Comment: last used crack/cocaine in 2000     Social History     Tobacco Use   Smoking Status Every Day    Current packs/day: 1.00    Average packs/day: 1 pack/day for 45.0 years (45.0 ttl pk-yrs)    Types: Cigarettes   Smokeless Tobacco Never     Family History   Problem Relation Age of Onset    Stroke Mother     Hypertension Father        Meds/Allergies       Current Outpatient Medications:     rosuvastatin (CRESTOR) 10 MG tablet  No current facility-administered medications for this encounter.    Facility-Administered Medications Ordered in Other Encounters:     lactated ringers infusion, , Intravenous, Continuous PRN, New Bag at 12/06/24  "0946    No Known Allergies    Objective     /66   Pulse 60   Temp (!) 97.3 °F (36.3 °C) (Temporal)   Resp 14   Ht 5' 4\" (1.626 m)   Wt 73.9 kg (163 lb)   SpO2 97%   BMI 27.98 kg/m²       PHYSICAL EXAM    Gen: NAD  Head: NCAT  CV: RRR  CHEST: Clear  ABD: soft, NT/ND  EXT: no edema      ASSESSMENT/PLAN:  This is a 59 y.o. year old male here for colonoscopy, and he is stable and optimized for his procedure.        "

## 2024-12-06 NOTE — ANESTHESIA PREPROCEDURE EVALUATION
Procedure:  COLONOSCOPY    Relevant Problems   ENDO   (+) Hypothyroidism      PULMONARY   (+) Smoking      Orthopedic/Musculoskeletal   (+) Carpal tunnel syndrome      Respiratory/Allergy   (+) Mild basilar atelectasis of both lungs      Dermatology   (+) Nail abnormalities      Other   (+) Dyslipidemia   (+) Prediabetes   (+) Preventative health care      Lab Results   Component Value Date    SODIUM 139 08/23/2024    K 3.7 08/23/2024     08/23/2024    CO2 27 08/23/2024    AGAP 8 08/23/2024    BUN 14 08/23/2024    CREATININE 0.73 08/23/2024    GLUC 116 01/19/2020    GLUF 99 08/23/2024    CALCIUM 9.1 08/23/2024    AST 18 08/23/2024    ALT 21 08/23/2024    ALKPHOS 61 08/23/2024    TP 6.5 08/23/2024    TBILI 0.81 08/23/2024    EGFR 103 08/23/2024     Lab Results   Component Value Date    WBC 6.36 08/23/2024    HGB 15.6 08/23/2024    HCT 48.1 08/23/2024    MCV 93 08/23/2024     08/23/2024       Physical Exam    Airway    Mallampati score: II  TM Distance: >3 FB  Neck ROM: limited     Dental       Cardiovascular      Pulmonary      Other Findings        Anesthesia Plan  ASA Score- 2     Anesthesia Type- IV sedation with anesthesia with ASA Monitors.         Additional Monitors:     Airway Plan:            Plan Factors-Exercise tolerance (METS): >4 METS.    Chart reviewed. EKG reviewed. Imaging results reviewed. Existing labs reviewed. Patient summary reviewed.                  Induction- intravenous.    Postoperative Plan-         Informed Consent- Anesthetic plan and risks discussed with patient.  I personally reviewed this patient with the CRNA. Discussed and agreed on the Anesthesia Plan with the CRNA..

## 2024-12-06 NOTE — ANESTHESIA POSTPROCEDURE EVALUATION
Post-Op Assessment Note    CV Status:  Stable    Pain management: adequate       Mental Status:  Alert and awake   Hydration Status:  Euvolemic   PONV Controlled:  Controlled   Airway Patency:  Patent     Post Op Vitals Reviewed: Yes    No anethesia notable event occurred.    Staff: CRNA           Last Filed PACU Vitals:  Vitals Value Taken Time   Temp     Pulse 78    BP 99/59    Resp 14    SpO2 97        Modified Areli:  Activity: 2 (12/6/2024 10:31 AM)  Respiration: 2 (12/6/2024 10:31 AM)  Circulation: 2 (12/6/2024 10:31 AM)  Consciousness: 2 (12/6/2024 10:31 AM)  Oxygen Saturation: 2 (12/6/2024 10:31 AM)  Modified Areli Score: 10 (12/6/2024 10:31 AM)

## 2024-12-09 PROCEDURE — 88305 TISSUE EXAM BY PATHOLOGIST: CPT | Performed by: PATHOLOGY

## 2024-12-10 ENCOUNTER — RESULTS FOLLOW-UP (OUTPATIENT)
Dept: GASTROENTEROLOGY | Facility: CLINIC | Age: 59
End: 2024-12-10

## 2024-12-22 NOTE — RESULT ENCOUNTER NOTE
12/16 CT chest revealing complete collapse of the left lower lobe and severe atelectasis of the right lower lobe similar to prior examination. Superimposed aspiration or pneumonia cannot be excluded. Trace left pleural effusion.   12/16 Sputum culture + Pseudomonas and Serratia    Plan  Cefepime D5 of 5  Aggressive chest PT, mucomyst/albuterol  Rest of plan as outlined      Patient had adenoma polyp removed during colonoscopy.  This is considered precancerous polyp.  Patient should have a follow-up colonoscopy in 3 years.  If any GI symptoms call our office for evaluation accordingly.

## 2024-12-24 ENCOUNTER — APPOINTMENT (OUTPATIENT)
Dept: LAB | Facility: AMBULARY SURGERY CENTER | Age: 59
End: 2024-12-24
Payer: COMMERCIAL

## 2024-12-24 ENCOUNTER — RESULTS FOLLOW-UP (OUTPATIENT)
Dept: FAMILY MEDICINE CLINIC | Facility: CLINIC | Age: 59
End: 2024-12-24

## 2024-12-24 DIAGNOSIS — R73.03 PREDIABETES: ICD-10-CM

## 2024-12-24 DIAGNOSIS — E78.5 DYSLIPIDEMIA: ICD-10-CM

## 2024-12-24 LAB
ALBUMIN SERPL BCG-MCNC: 4.5 G/DL (ref 3.5–5)
ALP SERPL-CCNC: 70 U/L (ref 34–104)
ALT SERPL W P-5'-P-CCNC: 53 U/L (ref 7–52)
ANION GAP SERPL CALCULATED.3IONS-SCNC: 6 MMOL/L (ref 4–13)
AST SERPL W P-5'-P-CCNC: 29 U/L (ref 13–39)
BILIRUB SERPL-MCNC: 1.04 MG/DL (ref 0.2–1)
BUN SERPL-MCNC: 17 MG/DL (ref 5–25)
CALCIUM SERPL-MCNC: 9.9 MG/DL (ref 8.4–10.2)
CHLORIDE SERPL-SCNC: 101 MMOL/L (ref 96–108)
CHOLEST SERPL-MCNC: 175 MG/DL (ref ?–200)
CO2 SERPL-SCNC: 34 MMOL/L (ref 21–32)
CREAT SERPL-MCNC: 0.76 MG/DL (ref 0.6–1.3)
EST. AVERAGE GLUCOSE BLD GHB EST-MCNC: 128 MG/DL
GFR SERPL CREATININE-BSD FRML MDRD: 101 ML/MIN/1.73SQ M
GLUCOSE P FAST SERPL-MCNC: 99 MG/DL (ref 65–99)
HBA1C MFR BLD: 6.1 %
HDLC SERPL-MCNC: 55 MG/DL
LDLC SERPL CALC-MCNC: 102 MG/DL (ref 0–100)
NONHDLC SERPL-MCNC: 120 MG/DL
POTASSIUM SERPL-SCNC: 4.4 MMOL/L (ref 3.5–5.3)
PROT SERPL-MCNC: 6.8 G/DL (ref 6.4–8.4)
SODIUM SERPL-SCNC: 141 MMOL/L (ref 135–147)
TRIGL SERPL-MCNC: 90 MG/DL (ref ?–150)

## 2024-12-24 PROCEDURE — 80053 COMPREHEN METABOLIC PANEL: CPT

## 2024-12-24 PROCEDURE — 36415 COLL VENOUS BLD VENIPUNCTURE: CPT

## 2024-12-24 PROCEDURE — 83036 HEMOGLOBIN GLYCOSYLATED A1C: CPT

## 2024-12-24 PROCEDURE — 80061 LIPID PANEL: CPT

## 2025-01-07 ENCOUNTER — OFFICE VISIT (OUTPATIENT)
Dept: FAMILY MEDICINE CLINIC | Facility: CLINIC | Age: 60
End: 2025-01-07
Payer: COMMERCIAL

## 2025-01-07 VITALS
BODY MASS INDEX: 29.53 KG/M2 | DIASTOLIC BLOOD PRESSURE: 74 MMHG | WEIGHT: 173 LBS | SYSTOLIC BLOOD PRESSURE: 122 MMHG | HEIGHT: 64 IN | HEART RATE: 78 BPM | OXYGEN SATURATION: 98 %

## 2025-01-07 DIAGNOSIS — R74.01 ELEVATED ALT MEASUREMENT: ICD-10-CM

## 2025-01-07 DIAGNOSIS — R73.03 PREDIABETES: Primary | ICD-10-CM

## 2025-01-07 DIAGNOSIS — E78.5 DYSLIPIDEMIA: ICD-10-CM

## 2025-01-07 PROCEDURE — 99214 OFFICE O/P EST MOD 30 MIN: CPT | Performed by: FAMILY MEDICINE

## 2025-01-07 RX ORDER — ROSUVASTATIN CALCIUM 5 MG/1
5 TABLET, COATED ORAL DAILY
Start: 2025-01-07

## 2025-01-07 NOTE — ASSESSMENT & PLAN NOTE
Cholesterol panel improved significantly since patient started Crestor.  Noted to have borderline high ALT, AST is normal.  Patient advised to reduce the dose of Crestor to 5 mg.  Repeat metabolic panel including liver enzymes at 4 months interval.  Overall patient is asymptomatic.  Advised to avoid hepatotoxins.

## 2025-01-07 NOTE — ASSESSMENT & PLAN NOTE
Patient noted to have an increase in ALT, corresponding to the use of statin.  Advised to reduce the dose of Crestor from 10 to 5 mg.  Patient cautioned about other hepatotoxins risk factors.  Will monitor his liver enzymes at 4 months interval.

## 2025-01-07 NOTE — ASSESSMENT & PLAN NOTE
Patient has history of prediabetes, A1c 6.1.  Encouraged to continue with low-carb diet/exercise/monitoring.

## 2025-05-13 ENCOUNTER — APPOINTMENT (OUTPATIENT)
Dept: LAB | Facility: AMBULARY SURGERY CENTER | Age: 60
End: 2025-05-13
Payer: COMMERCIAL

## 2025-05-13 DIAGNOSIS — E78.5 DYSLIPIDEMIA: ICD-10-CM

## 2025-05-13 DIAGNOSIS — R73.03 PREDIABETES: ICD-10-CM

## 2025-05-13 LAB
ALBUMIN SERPL BCG-MCNC: 4 G/DL (ref 3.5–5)
ALP SERPL-CCNC: 69 U/L (ref 34–104)
ALT SERPL W P-5'-P-CCNC: 19 U/L (ref 7–52)
ANION GAP SERPL CALCULATED.3IONS-SCNC: 5 MMOL/L (ref 4–13)
AST SERPL W P-5'-P-CCNC: 16 U/L (ref 13–39)
BILIRUB SERPL-MCNC: 0.95 MG/DL (ref 0.2–1)
BUN SERPL-MCNC: 13 MG/DL (ref 5–25)
CALCIUM SERPL-MCNC: 9.2 MG/DL (ref 8.4–10.2)
CHLORIDE SERPL-SCNC: 103 MMOL/L (ref 96–108)
CHOLEST SERPL-MCNC: 193 MG/DL (ref ?–200)
CO2 SERPL-SCNC: 32 MMOL/L (ref 21–32)
CREAT SERPL-MCNC: 0.73 MG/DL (ref 0.6–1.3)
GFR SERPL CREATININE-BSD FRML MDRD: 102 ML/MIN/1.73SQ M
GLUCOSE P FAST SERPL-MCNC: 92 MG/DL (ref 65–99)
HDLC SERPL-MCNC: 47 MG/DL
LDLC SERPL CALC-MCNC: 128 MG/DL (ref 0–100)
NONHDLC SERPL-MCNC: 146 MG/DL
POTASSIUM SERPL-SCNC: 4.2 MMOL/L (ref 3.5–5.3)
PROT SERPL-MCNC: 6.2 G/DL (ref 6.4–8.4)
SODIUM SERPL-SCNC: 140 MMOL/L (ref 135–147)
TRIGL SERPL-MCNC: 90 MG/DL (ref ?–150)

## 2025-05-13 PROCEDURE — 80053 COMPREHEN METABOLIC PANEL: CPT

## 2025-05-13 PROCEDURE — 36415 COLL VENOUS BLD VENIPUNCTURE: CPT

## 2025-05-13 PROCEDURE — 83036 HEMOGLOBIN GLYCOSYLATED A1C: CPT

## 2025-05-13 PROCEDURE — 80061 LIPID PANEL: CPT

## 2025-05-14 LAB
EST. AVERAGE GLUCOSE BLD GHB EST-MCNC: 134 MG/DL
HBA1C MFR BLD: 6.3 %

## 2025-05-22 ENCOUNTER — OFFICE VISIT (OUTPATIENT)
Dept: FAMILY MEDICINE CLINIC | Facility: CLINIC | Age: 60
End: 2025-05-22
Payer: COMMERCIAL

## 2025-05-22 VITALS
HEIGHT: 64 IN | HEART RATE: 67 BPM | WEIGHT: 172 LBS | SYSTOLIC BLOOD PRESSURE: 110 MMHG | DIASTOLIC BLOOD PRESSURE: 60 MMHG | OXYGEN SATURATION: 99 % | BODY MASS INDEX: 29.37 KG/M2

## 2025-05-22 DIAGNOSIS — Z98.890 HISTORY OF TRANSURETHRAL PROSTATECTOMY: ICD-10-CM

## 2025-05-22 DIAGNOSIS — R73.03 PREDIABETES: Primary | ICD-10-CM

## 2025-05-22 DIAGNOSIS — E78.5 DYSLIPIDEMIA: ICD-10-CM

## 2025-05-22 DIAGNOSIS — Z90.79 HISTORY OF TRANSURETHRAL PROSTATECTOMY: ICD-10-CM

## 2025-05-22 DIAGNOSIS — Z13.0 SCREENING FOR DEFICIENCY ANEMIA: ICD-10-CM

## 2025-05-22 PROBLEM — L60.9 NAIL ABNORMALITIES: Status: RESOLVED | Noted: 2024-08-12 | Resolved: 2025-05-22

## 2025-05-22 PROBLEM — J98.11 MILD BASILAR ATELECTASIS OF BOTH LUNGS: Status: RESOLVED | Noted: 2024-08-29 | Resolved: 2025-05-22

## 2025-05-22 PROCEDURE — 99214 OFFICE O/P EST MOD 30 MIN: CPT | Performed by: FAMILY MEDICINE

## 2025-05-22 RX ORDER — ASPIRIN 81 MG/1
81 TABLET, CHEWABLE ORAL DAILY
COMMUNITY

## 2025-05-22 RX ORDER — ROSUVASTATIN CALCIUM 5 MG/1
5 TABLET, COATED ORAL DAILY
Qty: 100 TABLET | Refills: 3 | Status: SHIPPED | OUTPATIENT
Start: 2025-05-22

## 2025-05-22 NOTE — PROGRESS NOTES
New Patient Outpatient Note    HPI:     Tong Rojas, 59 y.o. male  presents today as a new patient and to establish care.  The patient was previously followed by Dr. Marvel Dominique.  Prior to her leaving, she did recommend that the patient come down and dose on the Crestor from 10 mg to 5 mg.  Unfortunately it was difficult to cut the pills, and they ended up taking the 10 mg every other day to help with his cholesterol.  He does admit that he is not the best at remembering to take the medication on a regular basis, and therefore his most recent labs did show an elevation in his LDL.  Patient does have a history of smoking as well, it seems that he has had some follow-up imaging.  Overall there is some atelectasis versus scarring at the bases, but thankfully no nodules in the lungs that would indicate underlying lung cancer.  He does have follow-up imaging coming up in August/early September.    Family Hx  UTD in chart    Past Medical History[1]     Past Surgical History[2]     Current Medications[3]     SOCIAL:   Rent/Own:  Own  Currently living with: Son, wife  Stable food: Yes  Safe At Home: Yes  Hobbies:  Mark Shin   Profession/ employment: [employer]  Restriction to medical procedures:  None    SEXUAL HISTORY:   Preference:  Women  Sexually Active:  Not Currently  Birth Control:  NA    Psychological History  Psychiatric history: None  History of inpatient:  None  Current Therapy/ Provider:  None  Current Medications:  None    Substance History  Smoking: current smoking, 1ppd, 14 years  Alcohol Use: None  Substance use:  prior use in 2020, nothing recently      ROS:     Review of Systems   Constitutional:  Negative for chills, fever and unexpected weight change.   HENT:  Negative for congestion, ear discharge, ear pain, hearing loss, postnasal drip, rhinorrhea, sinus pressure, sinus pain and sore throat.    Eyes:  Negative for visual disturbance.   Respiratory:  Negative for cough, chest tightness and  shortness of breath.    Cardiovascular:  Negative for chest pain and palpitations.   Gastrointestinal:  Negative for abdominal pain, blood in stool, constipation, diarrhea, nausea and vomiting.   Genitourinary:  Positive for dysuria. Negative for frequency.   Skin:  Negative for rash and wound.   Neurological:  Negative for dizziness, light-headedness and headaches.   Psychiatric/Behavioral:  Negative for self-injury and suicidal ideas.         OBJECTIVE  Vitals:    05/22/25 1042   BP: 110/60   Pulse: 67   SpO2: 99%        Physical Exam  Constitutional:       General: He is not in acute distress.     Appearance: Normal appearance. He is normal weight. He is not ill-appearing, toxic-appearing or diaphoretic.   HENT:      Head: Normocephalic and atraumatic.      Right Ear: Tympanic membrane, ear canal and external ear normal. There is no impacted cerumen.      Left Ear: Tympanic membrane, ear canal and external ear normal. There is no impacted cerumen.      Nose: Nose normal. No congestion or rhinorrhea.      Mouth/Throat:      Mouth: Mucous membranes are moist.      Pharynx: Oropharynx is clear. No oropharyngeal exudate or posterior oropharyngeal erythema.     Eyes:      General:         Right eye: No discharge.         Left eye: No discharge.      Pupils: Pupils are equal, round, and reactive to light.       Cardiovascular:      Rate and Rhythm: Normal rate and regular rhythm.      Heart sounds: Normal heart sounds. No murmur heard.     No friction rub. No gallop.   Pulmonary:      Effort: Pulmonary effort is normal. No respiratory distress.      Breath sounds: Normal breath sounds. No stridor. No wheezing, rhonchi or rales.   Abdominal:      General: There is no distension.      Tenderness: There is no abdominal tenderness.     Neurological:      Mental Status: He is alert.      Sensory: No sensory deficit (light touch in all 4 extremities).      Motor: No weakness (5/5 in all 4 extremities).      Deep Tendon  Reflexes: Reflexes normal (2/4, intact, C5, C6, L4, S1).            ASSESSMENT AND PLAN   Tong was seen today for follow-up.  Diagnoses and all orders for this visit:    Prediabetes  Patient presents today to review labs and to establish care.  The patient's most recent A1c was increased from last value.  Previously in December 2024 it was 6.1, now it is 6.3.  I did review the importance of cutting back on carbohydrates/sugars.  We had a cooepr conversation that if his trend continues he will be diabetic by the next A1c.  Therefore he needs to cut back on the bread, pasta, potatoes, rice, cookies, cakes, pretzels, chips.  -     Comprehensive metabolic panel; Future  -     Hemoglobin A1C; Future    Dyslipidemia  Patient has not been taking medication daily.  We will send a 5 mg tablet since she was having difficulty with cutting it without having the tablet crumble.  He is to take the 5 mg daily and repeat lipid panel in 3 months to determine if there is any need to increase in dose.  -     rosuvastatin (CRESTOR) 5 mg tablet; Take 1 tablet (5 mg total) by mouth daily  -     Lipid panel; Future    History of transurethral prostatectomy  Patient has a history of TURP.  Therefore PSA should be monitored yearly.  Last PSA was performed in August 2024 at 0.4.  Will need to continue to monitor and see if this increases with time.  If it is, may need to consider follow-up with urology for further evaluation  -     PSA, Total Screen; Future    Screening for deficiency anemia  Since patient is getting labs in about 3 months.  This is around the time when he gets his screening test performed.  Will obtain CBC for review for evidence of anemia.  -     CBC and differential; Future       DO Sky Goodman [facility]  5/23/2025 8:47 AM          [1]   Past Medical History:  Diagnosis Date    Allergic childhood    Allergic to bee /hornet stings    Bicipital tendinitis of left shoulder 01/13/2014    Colon polyp      Depression     Diverticulitis of colon 2019    Diverticulitis of large intestine with abscess without bleeding     Rotator cuff tendinitis 01/09/2014   [2]   Past Surgical History:  Procedure Laterality Date    COLONOSCOPY      FL RETROGRADE PYELOGRAM  09/22/2020    AR CYSTO W/REMOVAL OF TUMORS SMALL N/A 09/22/2020    Procedure: TRANSURETHRAL RESECTION OF BLADDER TUMOR (TURBT);  Surgeon: Tarun Santoro MD;  [facility];  Service: Urology    AR CYSTOURETHROSCOPY W/URETERAL CATHETERIZATION Bilateral 09/22/2020    Procedure: CYSTOSCOPY RETROGRADE PYELOGRAM;  Surgeon: Tarun Santoro MD;  [facility];  Service: Urology    TRANSURETHRAL RESECTION OF PROSTATE N/A 09/22/2020    Procedure: Transurethral incision of prostate;  Surgeon: Tarun Santoro MD;  [facility];  Service: Urology    WISDOM TOOTH EXTRACTION     [3]   Current Outpatient Medications:     aspirin 81 mg chewable tablet, Chew 81 mg daily, Disp: , Rfl:     rosuvastatin (CRESTOR) 5 mg tablet, Take 1 tablet (5 mg total) by mouth daily, Disp: 100 tablet, Rfl: 3

## (undated) DEVICE — PACK TUR

## (undated) DEVICE — PAD GROUNDING ADULT

## (undated) DEVICE — Device: Brand: OLYMPUS

## (undated) DEVICE — GUIDEWIRE STRGHT TIP 0.035 IN  SOLO PLUS

## (undated) DEVICE — UROCATCH BAG

## (undated) DEVICE — CATH URETERAL 5FR X 70 CM FLEX TIP POLYUR BARD

## (undated) DEVICE — INVIEW CLEAR LEGGINGS: Brand: CONVERTORS

## (undated) DEVICE — CATH FOLEY 22FR 5ML 2 WAY SILICONE ELASTIMER

## (undated) DEVICE — STERILE SURGICAL LUBRICANT,  TUBE: Brand: SURGILUBE

## (undated) DEVICE — CATH FOLEY 18FR 5ML 2 WAY SILICONE ELASTIMER

## (undated) DEVICE — SPECIMEN CONTAINER STERILE PEEL PACK

## (undated) DEVICE — CHLORHEXIDINE 4PCT 4 OZ

## (undated) DEVICE — PREMIUM DRY TRAY LF: Brand: MEDLINE INDUSTRIES, INC.

## (undated) DEVICE — GLOVE INDICATOR PI UNDERGLOVE SZ 8 BLUE

## (undated) DEVICE — GLOVE SRG BIOGEL ECLIPSE 7.5